# Patient Record
Sex: MALE | Race: WHITE | NOT HISPANIC OR LATINO | Employment: OTHER | URBAN - METROPOLITAN AREA
[De-identification: names, ages, dates, MRNs, and addresses within clinical notes are randomized per-mention and may not be internally consistent; named-entity substitution may affect disease eponyms.]

---

## 2017-01-13 ENCOUNTER — OFFICE VISIT (OUTPATIENT)
Dept: PULMONOLOGY | Facility: CLINIC | Age: 37
End: 2017-01-13
Payer: OTHER GOVERNMENT

## 2017-01-13 VITALS
OXYGEN SATURATION: 98 % | BODY MASS INDEX: 30.71 KG/M2 | SYSTOLIC BLOOD PRESSURE: 118 MMHG | RESPIRATION RATE: 18 BRPM | HEART RATE: 88 BPM | WEIGHT: 214.5 LBS | HEIGHT: 70 IN | DIASTOLIC BLOOD PRESSURE: 70 MMHG

## 2017-01-13 DIAGNOSIS — G47.33 OSA (OBSTRUCTIVE SLEEP APNEA): Primary | ICD-10-CM

## 2017-01-13 PROCEDURE — 99999 PR PBB SHADOW E&M-EST. PATIENT-LVL III: CPT | Mod: PBBFAC,,, | Performed by: INTERNAL MEDICINE

## 2017-01-13 PROCEDURE — 99204 OFFICE O/P NEW MOD 45 MIN: CPT | Mod: S$PBB,,, | Performed by: INTERNAL MEDICINE

## 2017-01-13 PROCEDURE — 99213 OFFICE O/P EST LOW 20 MIN: CPT | Mod: PBBFAC | Performed by: INTERNAL MEDICINE

## 2017-01-13 NOTE — PATIENT INSTRUCTIONS
Continuous Positive Airway Pressure (CPAP)  Your healthcare provider has prescribed continuous positive airway pressure (CPAP) therapy for you. A CPAP device helps you breathe better at night. The device sends air through your nose or mouth when you breathe in to keep your air passages open. CPAP is:  · Used most often to treat sleep apnea and some other problems. (Sleep apnea is a chronic condition with periods of sleep in which you briefly stop breathing.)  · Safe and very effective. But it takes time to get used to the mask.  Your healthcare provider, nurse, or medical supplier will give you tips for wearing and caring for your CPAP device.  General guidelines  Recommendations include the following:  · It's very important not to give up! It takes time to get used to wearing the mask at night.  · Practice using your CPAP device during the day, especially whenever you take a nap.  · Remember, there are several different types of masks. If you cant get used to your mask, ask your provider or medical supply company about trying another style.  · If you have nasal stuffiness or dryness when using your CPAP device, talk with your provider or medical supply company. There are ways to ease these problems. For example, your provider may recommend using a moistening nasal spray. Or the medical supply company may recommend a device with a humidifier.  · The goal is to use your CPAP all night, every night, during all naps, and even when you travel.  · Keep your mask clean. Wash it with soap and water. Be sure to rinse the mask and tubing well with water to remove any soap. Let them air-dry completely before using.  · Make yourself comfortable when sleeping with CPAP. Try using extra pillows.  Work with your medical supply company so that you know how to correctly use your CPAP. The company's representative will be able to help you:  · Use the CPAP correctly  · Troubleshoot any problems that come up  · Learn to clean and  maintain the device  · Adjust to regular use of the CPAP     The CPAP device settings are given as centimeters of water, or cm/H2O. Each persons pressure settings are different. Your healthcare provider will tell you what settings to use. Never change your CPAP pressure setting unless your provider tells you to.  CPAP ____________cm/H20 pressure when you breathe in   © 0292-6771 [employer]. [address] [place] [address], [place] [address]. All rights reserved. This information is not intended as a substitute for professional medical care. Always follow your healthcare professional's instructions.

## 2017-01-13 NOTE — LETTER
January 13, 2017      Mahsa Traylor MD  [address] [address] [address]           [facility]  [address] [place] [address]  Phone: [phone]  Fax: [phone]          Patient: Mickey Dominguez   MR Number: [id number]   YOB: 1980   Date of Visit: 1/13/2017       Dear Dr. Mahsa Traylor:    Thank you for referring Mickey Dominguez to me for evaluation. Attached you will find relevant portions of my assessment and plan of care.    If you have questions, please do not hesitate to call me. I look forward to following Mickey Dominguez along with you.    Sincerely,    Jordan Vitale MD    Enclosure  CC:  No Recipients    If you would like to receive this communication electronically, please contact [email or web address] or [phone] to request more information on [facility] [employer] Link access.    For providers and/or their staff who would like to refer a patient to [facility], please contact us through our one-stop-shop provider referral line, [facility], at [phone].    If you feel you have received this communication in error or would no longer like to receive these types of communications, please e-mail [email or web address]

## 2017-01-13 NOTE — PROGRESS NOTES
Subjective:      Patient ID: Mickey Dominguez is a 36 y.o. male.    There is no problem list on file for this patient.      Problem list has been reviewed.    Chief Complaint: Sleep Apnea        he has been referred by Mahsa Traylor for evaluation for possible obstructive sleep apnea    HPI:   Sleep Apnea:    He has a sleep study at sleep [facility] 6 months ago. He was diagnosed CLAUDE. He had a CPAP titration. His best pressure was CPAP of 13 CMWP.  CPAP was ordered and he is still waiting for his CPAP. He is still           STOP - BANG Questionnaire:     1. Snoring : Do you snore loudly ?    Yes    2. Tired : Do you often feel tired, fatigued, or sleepy during daytime? Yes    3. Observed: Has anyone observed you stop breathing during your sleep?   Yes     4. Blood pressure : Do you have or are you being treated for high blood pressure?   No    5. BMI :BMI more than 35 kg/m2?   No    6. Age : Age over 50 yr old?   No    7. Neck circumference: Neck circumference greater than 40 cm?   No    8. Gender: Gender male?   Yes    High risk of CLAUDE: Yes 5 - 8  Intermediate risk of CLAUDE: Yes 3 - 4  Low risk of CLAUDE: Yes 0 - 2      References:   STOP Questionnaire   A Tool to Screen Patients for Obstructive Sleep Apnea: LILIYA Callejas.R.C.P.C., JUANA Nieves.B.B.S., Davina Ceron M.D.,Madelin Reyes, Ph.D., JUANA Sharp.B.B.S.,_ JUANA Pike.Sc.,_ Jeanne Tilley M.D., LILIYA Rossi.R.C.P.C.; Anesthesiology 2008; 108:812-21 Copyright © 2008, the American Society of Anesthesiologists, Inc. Jami Ryan [employer]angel[employer], Inc.    EPWORTH SLEEPINESS SCALE    Sitting and reading ____  1  Watching TV ____  1  Sitting inactive in a public place ____  0  Being a passenger in a motor vehicle for an hour or more ____  1  Lying down in the afternoon  ____  0  Sitting and talking to someone  ____  0  Sitting quietly after lunch (no alcohol) ____  0  Stopped for a few minutes in traffic while driving   ____  0    Total score   ____  3    Interpretation:  0-7: Normal  sleepiness  8-9: Average amount of daytime sleepiness.  10-15:Excessively sleepiness depending on the situation. Medical attention suggested.  16-24: Excessive sleepiness and medical attention recommended.    Reference: Ariadne CASTRO. A new method for measuring daytime sleepiness: The [place]  Sleepiness Scale. Sleep 1991; 14(6):540-5.    Previous Report Reviewed: office notes     The following portions of the patient's history were reviewed and updated as appropriate: He  has a past medical history of Allergy; Anxiety; Arthritis; Closed skull fracture with intracranial hemorrhage, with loss of consciousness; Neuromuscular disorder; Obstructive sleep apnea on CPAP; and PTSD (post-traumatic stress disorder).  He  has a past surgical history that includes Rotator cuff repair; menicus repair; and Fracture surgery.  His family history is not on file.  He  reports that he has never smoked. He has never used smokeless tobacco. He reports that he does not drink alcohol or use illicit drugs.  He has a current medication list which includes the following prescription(s): amitriptyline, cyclobenzaprine, eszopiclone, and nabumetone.  He has No Known Allergies..    Review of Systems   HENT: Positive for hearing loss.    Respiratory: Positive for apnea.    Cardiovascular: Negative for chest pain and leg swelling.   Musculoskeletal: Positive for arthralgias, back pain and myalgias.   Gastrointestinal: Negative for abdominal pain and abdominal distention.   Neurological: Positive for headaches.   Psychiatric/Behavioral: The patient is nervous/anxious.    All other systems reviewed and are negative.     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    Avocational Exposures:  None currently.    Pet Exposures:  Dogs. Denies allergy to dog dander.    Objective:     Vitals:    01/13/17 0902   BP: 118/70  "  Pulse: 88   Resp: 18   SpO2: 98%   Weight: 97.3 kg (214 lb 8.1 oz)   Height: 5' 10" (1.778 m)     body mass index is 30.78 kg/(m^2).     Physical Exam   Constitutional: He appears well-developed and well-nourished.   HENT:   Head: Normocephalic and atraumatic.   Eyes: EOM are normal. Pupils are equal, round, and reactive to light.   Neck: Normal range of motion. Neck supple.   Cardiovascular: Normal rate and regular rhythm.    Pulmonary/Chest: Effort normal and breath sounds normal.   Abdominal: Soft. Bowel sounds are normal.   Musculoskeletal: Normal range of motion.   Neurological: He is alert. He has normal reflexes.   Skin: Skin is warm and dry.   Psychiatric: He has a normal mood and affect.   Nursing note and vitals reviewed.      Personal Diagnostic Review  none pertinent    Assessment:     1. CLAUDE (obstructive sleep apnea)        Orders Placed This Encounter   Procedures    CPAP FOR HOME USE     Order Specific Question:   Type:     Answer:   Auto CPAP     Order Specific Question:   Auto CPAP pressure setting range (cmH20):     Answer:   4 - 20 CMWP     Order Specific Question:   Length of need (1-99 months):     Answer:   99     Order Specific Question:   Humidification:     Answer:   Heated     Order Specific Question:   Type of mask:     Answer:   FFM     Order Specific Question:   Headgear?     Answer:   Yes     Order Specific Question:   Tubing?     Answer:   Yes     Order Specific Question:   Humidifier chamber?     Answer:   Yes     Order Specific Question:   Chin strap?     Answer:   Yes     Order Specific Question:   Filters?     Answer:   Yes       Plan:     Discussed diagnosis, its evaluation, treatment and usual course. All questions answered.    Start AUTOCPAP of  4 - 20 CMWP. [facility] Discussed therapeutic goals for positive airway pressure therapy(CPAP or BiPAP): Ideal is usage 100% of nights for 6 - 8 hours per night. Minimum usage is 70% of night for at least 4 hours per night " used. Pateint expressed understanding. All Questions answered.    We have discussed weight loss and how this may improve his  situation.    We have discussed behavioral modifications, as well.        TIME SPENT WITH PATIENT: Time spent: 45 minutes in face to face  discussion concerning diagnosis, prognosis, review of lab and test results, benefits of treatment as well as management of disease, counseling of patient and coordination of care between various health  care providers . Greater than half the time spent was used for coordination of care and counseling of patient.     Return in about 1 month (around 2/13/2017) for CLAUDE.

## 2017-04-24 ENCOUNTER — OFFICE VISIT (OUTPATIENT)
Dept: FAMILY MEDICINE | Facility: CLINIC | Age: 37
End: 2017-04-24
Payer: OTHER GOVERNMENT

## 2017-04-24 ENCOUNTER — HOSPITAL ENCOUNTER (OUTPATIENT)
Dept: RADIOLOGY | Facility: HOSPITAL | Age: 37
Discharge: HOME | End: 2017-04-24
Attending: FAMILY MEDICINE
Payer: OTHER GOVERNMENT

## 2017-04-24 VITALS — DIASTOLIC BLOOD PRESSURE: 88 MMHG | TEMPERATURE: 99 F | SYSTOLIC BLOOD PRESSURE: 140 MMHG

## 2017-04-24 DIAGNOSIS — G47.33 OSA ON CPAP: ICD-10-CM

## 2017-04-24 DIAGNOSIS — F51.04 PSYCHOPHYSIOLOGIC INSOMNIA: Primary | ICD-10-CM

## 2017-04-24 DIAGNOSIS — M25.561 RIGHT KNEE PAIN, UNSPECIFIED CHRONICITY: ICD-10-CM

## 2017-04-24 DIAGNOSIS — R09.82 POST-NASAL DRAINAGE: ICD-10-CM

## 2017-04-24 PROCEDURE — 73562 X-RAY EXAM OF KNEE 3: CPT | Mod: 26,RT,, | Performed by: RADIOLOGY

## 2017-04-24 PROCEDURE — 73560 X-RAY EXAM OF KNEE 1 OR 2: CPT | Mod: TC,PO,LT

## 2017-04-24 PROCEDURE — 99999 PR PBB SHADOW E&M-EST. PATIENT-LVL III: CPT | Mod: PBBFAC,,, | Performed by: FAMILY MEDICINE

## 2017-04-24 PROCEDURE — 99214 OFFICE O/P EST MOD 30 MIN: CPT | Mod: S$PBB,,, | Performed by: FAMILY MEDICINE

## 2017-04-24 PROCEDURE — 73560 X-RAY EXAM OF KNEE 1 OR 2: CPT | Mod: 26,59,LT, | Performed by: RADIOLOGY

## 2017-04-24 RX ORDER — FLUTICASONE PROPIONATE 50 MCG
2 SPRAY, SUSPENSION (ML) NASAL DAILY
Qty: 16 G | Refills: 5 | Status: SHIPPED | OUTPATIENT
Start: 2017-04-24 | End: 2017-09-27 | Stop reason: SDUPTHER

## 2017-04-24 RX ORDER — ESZOPICLONE 3 MG/1
TABLET, FILM COATED ORAL
Qty: 30 TABLET | Refills: 0 | Status: SHIPPED | OUTPATIENT
Start: 2017-04-24 | End: 2017-09-27 | Stop reason: SDUPTHER

## 2017-04-24 NOTE — PROGRESS NOTES
Subjective:       Patient ID: Mickey Dominguez is a 36 y.o. male.    Chief Complaint: Trouble Sleeping and Right Knee Pain    HPI   Trouble Sleeping  35yo male presents today reporting persistent trouble sleeping. He has been diagnosed with sleep apnea and is now using a CPAP after assessment per Pulmonology. He notes that he has been compliant with use since mid-February and has been using the mask for approximately 5 hours nightly. His headaches have improved significantly with use. He was previously taking Lunesta and has been out of RX. He has tried Melatonin and decreasing caffeine intake with minimal change in sleep. He remotely used Benadryl for unrelated issues and has been sleepy with use. He is on Amitriptyline but this does not help with sleep. He has never had evaluation by a sleep specialist.    Right Knee Pain  Patient notes right knee pain that has been ongoing for several years. He has a history of meniscal tear but admits he never underwent surgical intervention. A few days ago he reports that he began experiencing knee pain accompanied with stiffness and pain with weight bearing. No known injuries of late but he has been crawling around on the floor. He states the knee has been locking up intermittently. He describes audible clicking and popping. Pain is rated at 4/10 pain at its worst. He has been taking Relafen which helps. He notes use on a prn basis. He has not had any imaging in several years. He had an MRI 4-5 years ago at [facility].     Review of Systems   Constitutional: Positive for fatigue. Negative for appetite change and unexpected weight change.   HENT: Negative for congestion, ear pain, postnasal drip and sinus pressure.    Eyes: Negative for redness, itching and visual disturbance.   Respiratory: Negative for cough and shortness of breath.    Cardiovascular: Negative for chest pain and palpitations.   Gastrointestinal: Negative for abdominal pain, constipation, diarrhea and  nausea.   Endocrine: Negative for cold intolerance and heat intolerance.   Genitourinary: Negative for decreased urine volume and difficulty urinating.   Musculoskeletal: Positive for arthralgias and back pain. Negative for myalgias.   Skin: Negative for color change and rash.   Neurological: Negative for dizziness, weakness, numbness and headaches.   Psychiatric/Behavioral: Positive for sleep disturbance. Negative for dysphoric mood. The patient is not nervous/anxious.        Objective:   BP (!) 140/88 (BP Location: Left arm, Patient Position: Sitting, BP Method: Manual)  Temp 98.5 °F (36.9 °C) (Tympanic)   Physical Exam   Constitutional: He is oriented to person, place, and time. He appears well-developed. No distress.   Obese, non-toxic   HENT:   Head: Normocephalic and atraumatic.   Right Ear: Tympanic membrane, external ear and ear canal normal.   Left Ear: Tympanic membrane, external ear and ear canal normal.   Nose: Mucosal edema and rhinorrhea present.   Mouth/Throat: Oropharynx is clear and moist.   Eyes: Conjunctivae and EOM are normal. Pupils are equal, round, and reactive to light.   Neck: Normal range of motion. Neck supple.   Cardiovascular: Normal rate, regular rhythm and normal heart sounds.    Pulmonary/Chest: Effort normal and breath sounds normal.   Abdominal: Soft. Bowel sounds are normal. There is no tenderness.   Musculoskeletal: He exhibits no edema.        Right knee: He exhibits normal range of motion, no erythema and normal alignment. No tenderness found. No medial joint line and no lateral joint line tenderness noted.   +crepitus right knee   Neurological: He is alert and oriented to person, place, and time.   Skin: Skin is warm and dry. He is not diaphoretic.   Psychiatric: He has a normal mood and affect. His behavior is normal.       Assessment:       1. Psychophysiologic insomnia    2. CLAUDE on CPAP    3. Post-nasal drainage    4. Right knee pain, unspecified chronicity        Plan:    Psychophysiologic insomnia  Recommend pursuing evaluation per [facility]. Reviewed sleep hygiene measures. Renewal of Lunesta has been provided but there is no plan to continue with this RX long term- he is agreeable and acknowledges understanding. Will assess labs.   -     TSH; Future; Expected date: 4/24/17  -     T4, free; Future; Expected date: 4/24/17  -     Comprehensive metabolic panel; Future; Expected date: 4/24/17  -     eszopiclone 3 mg Tab; TAKE 1 TABLET BY MOUTH IMMEDIATELY BEFORE BEDTIME EVERY NIGHT  Dispense: 30 tablet; Refill: 0    CLAUDE on CPAP  Recommend compliance with use and sleep hygiene measures as above.    Post-nasal drainage  -     fluticasone (FLONASE) 50 mcg/actuation nasal spray; 2 sprays by Each Nare route once daily.  Dispense: 16 g; Refill: 5    Right knee pain, unspecified chronicity  Patient is currently seeing Ortho (non[facility]) and has been advised to proceed with imaging and further assessment. Relafen use will be continued.  -     X-ray Knee Ortho Right; Future; Expected date: 4/24/17    RTC for wellness at convenience.

## 2017-04-24 NOTE — MR AVS SNAPSHOT
[facility]  [address]  [facility] [address]  Phone: [phone]  Fax: [phone]                  Mickey Dominguez   2017 11:20 AM   Office Visit    Description:  Male : 1980   Provider:  Mahsa Traylor MD   Department:  [facility]           Reason for Visit     Trouble Sleeping     Right Knee Pain           Diagnoses this Visit        Comments    CLAUDE on CPAP    -  Primary     Psychophysiologic insomnia         Post-nasal drainage         Right knee pain, unspecified chronicity                To Do List           Future Appointments        Provider Department Dept Phone    2017 12:15 PM [facility] XR1 [facility] [phone]    2017 3:00 PM [facility]ATORY, [place] [facility]       Matt (5 Years of Data)     None       These Medications        Disp Refills Start End    eszopiclone 3 mg Tab 30 tablet 0 2017     TAKE 1 TABLET BY MOUTH IMMEDIATELY BEFORE BEDTIME EVERY NIGHT    Pharmacy: [facility] [address] [place] [address] Ph #: [phone]       fluticasone (FLONASE) 50 mcg/actuation nasal spray 16 g 5 2017     2 sprays by Each Nare route once daily. - Each Nare    Pharmacy: [facility] [address] [place] [address] Ph #: [phone]         [facility]     [facility] Nurse Care Line -  Assistance  Unless otherwise directed by your provider, please contact [facility], our nurse care line that is available for  assistance.     Registered nurses in the [facility] provide: appointment scheduling, clinical advisement, health education, and other advisory services.  Call: [phone] (toll free)               Medications           Message regarding Medications     Verify the changes and/or additions to your medication regime listed below are the same as discussed with your clinician today.  If any  of these changes or additions are incorrect, please notify your healthcare provider.        START taking these NEW medications        Refills    fluticasone (FLONASE) 50 mcg/actuation nasal spray 5    Si sprays by Each Nare route once daily.    Class: Normal    Route: Each Nare           Verify that the below list of medications is an accurate representation of the medications you are currently taking.  If none reported, the list may be blank. If incorrect, please contact your healthcare provider. Carry this list with you in case of emergency.           Current Medications     amitriptyline (ELAVIL) 75 MG tablet Take 1 tablet (75 mg total) by mouth every evening.    cyclobenzaprine (FLEXERIL) 10 MG tablet Take 1 tablet (10 mg total) by mouth every evening.    eszopiclone 3 mg Tab TAKE 1 TABLET BY MOUTH IMMEDIATELY BEFORE BEDTIME EVERY NIGHT    nabumetone (RELAFEN) 500 MG tablet Take 1 tablet (500 mg total) by mouth 2 (two) times daily as needed for Pain.    fluticasone (FLONASE) 50 mcg/actuation nasal spray 2 sprays by Each Nare route once daily.           Clinical Reference Information           Your Vitals Were     BP Temp                140/88 (BP Location: Left arm, Patient Position: Sitting, BP Method: Manual) 98.5 °F (36.9 °C) (Tympanic)          Blood Pressure          Most Recent Value    BP  (!)  140/88      Allergies as of 2017     No Known Allergies      Immunizations Administered on Date of Encounter - 2017     None      Orders Placed During Today's Visit     Future Labs/Procedures Expected by Expires    Comprehensive metabolic panel  2017    T4, free  2017    TSH  2017    X-ray Knee Ortho Right  2017      Language Assistance Services     ATTENTION: Language assistance services are available, free of charge. Please call [phone].      ATENCIÓN: Si habla español, tiene a zuñiga disposición servicios gratuitos de asistencia  lingüística. Sari al [phone].     REJI Ý: N?u b?n nói Ti?ng Vi?t, có các d?ch v? h? tr? ngôn ng? mi?n phí dành cho b?n. G?i s? [phone].         [facility] complies with applicable Federal civil rights laws and does not discriminate on the basis of race, color, national origin, age, disability, or sex.

## 2017-05-15 ENCOUNTER — TELEPHONE (OUTPATIENT)
Dept: FAMILY MEDICINE | Facility: CLINIC | Age: 37
End: 2017-05-15

## 2017-05-15 DIAGNOSIS — G47.33 OSA ON CPAP: Primary | ICD-10-CM

## 2017-05-15 DIAGNOSIS — F51.04 PSYCHOPHYSIOLOGIC INSOMNIA: ICD-10-CM

## 2017-05-15 NOTE — TELEPHONE ENCOUNTER
Please print referral for [facility] and let patient know this is being sent after building it into the system.

## 2017-05-17 NOTE — TELEPHONE ENCOUNTER
5/16/2017   Nalini Serrano 12:01 PM      pt julia charles mrn# [id number] has  he has a referral for [facility] [address]  [facility] [address]   Phone: [phone]   thanks for helping out      Angela Cardona 2:20 PM      Authorization is pending with Luis.      Nalini Serrano 2:24 PM      thank you     Angela Cardona 2:30 PM      you're welcome     Called pt and informed him on voicemail that the referral is being worked on by [facility] referral dept. Will keep him updated.

## 2017-05-17 NOTE — TELEPHONE ENCOUNTER
Angela Cardona 10:26 AM      patient has an authorization on file with Luis to Dr Jordan Vitale for sleep apnea. Luis wants to know if the referral to [facility] is for a 2nd opinion or is the patient changing providers.

## 2017-05-26 ENCOUNTER — TELEPHONE (OUTPATIENT)
Dept: FAMILY MEDICINE | Facility: CLINIC | Age: 37
End: 2017-05-26

## 2017-05-26 NOTE — TELEPHONE ENCOUNTER
Unable to reach pt, left voice mail.        [facility] has denied authorization for referral to [facility] because the facility is not in network with the patient's  [employer]. [facility] has redirected the patient to Dr Mimi Tena and has an authorization on file.       [facility] MESSAGE:    COMMUNICATION TO PROVIDER    THE FACILITY/PROVIDER YOU REQUESTED IS NOT LISTED AS A [facility] NETWORK    PROVIDER; A [facility] NETWORK FACILITY/PROVIDER HAS BEEN SELECTED.    FACILITY/PROVIDER'S NAME: MIMI TENA/[facility] SLEEP DISORDERS

## 2017-09-27 ENCOUNTER — HOSPITAL ENCOUNTER (OUTPATIENT)
Dept: RADIOLOGY | Facility: HOSPITAL | Age: 37
Discharge: HOME | End: 2017-09-27
Attending: FAMILY MEDICINE
Payer: OTHER GOVERNMENT

## 2017-09-27 ENCOUNTER — OFFICE VISIT (OUTPATIENT)
Dept: FAMILY MEDICINE | Facility: CLINIC | Age: 37
End: 2017-09-27
Payer: OTHER GOVERNMENT

## 2017-09-27 VITALS
HEIGHT: 71 IN | HEART RATE: 82 BPM | SYSTOLIC BLOOD PRESSURE: 138 MMHG | RESPIRATION RATE: 15 BRPM | BODY MASS INDEX: 30.98 KG/M2 | DIASTOLIC BLOOD PRESSURE: 88 MMHG | WEIGHT: 221.25 LBS | OXYGEN SATURATION: 99 % | TEMPERATURE: 98 F

## 2017-09-27 DIAGNOSIS — F43.10 PTSD (POST-TRAUMATIC STRESS DISORDER): ICD-10-CM

## 2017-09-27 DIAGNOSIS — E66.9 OBESITY (BMI 30-39.9): ICD-10-CM

## 2017-09-27 DIAGNOSIS — G89.29 CHRONIC LOW BACK PAIN WITHOUT SCIATICA, UNSPECIFIED BACK PAIN LATERALITY: Primary | ICD-10-CM

## 2017-09-27 DIAGNOSIS — F51.04 PSYCHOPHYSIOLOGIC INSOMNIA: ICD-10-CM

## 2017-09-27 DIAGNOSIS — M62.830 LUMBAR PARASPINAL MUSCLE SPASM: ICD-10-CM

## 2017-09-27 DIAGNOSIS — G47.33 OSA ON CPAP: ICD-10-CM

## 2017-09-27 DIAGNOSIS — R05.9 COUGH: ICD-10-CM

## 2017-09-27 DIAGNOSIS — M54.50 CHRONIC LOW BACK PAIN WITHOUT SCIATICA, UNSPECIFIED BACK PAIN LATERALITY: Primary | ICD-10-CM

## 2017-09-27 DIAGNOSIS — J20.9 ACUTE BRONCHITIS, UNSPECIFIED ORGANISM: ICD-10-CM

## 2017-09-27 DIAGNOSIS — R09.82 POST-NASAL DRAINAGE: ICD-10-CM

## 2017-09-27 PROCEDURE — 99999 PR PBB SHADOW E&M-EST. PATIENT-LVL IV: CPT | Mod: PBBFAC,,, | Performed by: FAMILY MEDICINE

## 2017-09-27 PROCEDURE — 71020 XR CHEST PA AND LATERAL: CPT | Mod: 26,,, | Performed by: RADIOLOGY

## 2017-09-27 PROCEDURE — 71020 XR CHEST PA AND LATERAL: CPT | Mod: TC,PO

## 2017-09-27 PROCEDURE — 3008F BODY MASS INDEX DOCD: CPT | Mod: ,,, | Performed by: FAMILY MEDICINE

## 2017-09-27 PROCEDURE — 99214 OFFICE O/P EST MOD 30 MIN: CPT | Mod: S$PBB,,, | Performed by: FAMILY MEDICINE

## 2017-09-27 PROCEDURE — 99214 OFFICE O/P EST MOD 30 MIN: CPT | Mod: PBBFAC,25,PO | Performed by: FAMILY MEDICINE

## 2017-09-27 RX ORDER — NABUMETONE 500 MG/1
500 TABLET, FILM COATED ORAL 2 TIMES DAILY PRN
Qty: 180 TABLET | Refills: 1 | Status: SHIPPED | OUTPATIENT
Start: 2017-09-27 | End: 2018-05-28 | Stop reason: SDUPTHER

## 2017-09-27 RX ORDER — AMITRIPTYLINE HYDROCHLORIDE 75 MG/1
75 TABLET ORAL NIGHTLY
Qty: 90 TABLET | Refills: 1 | Status: SHIPPED | OUTPATIENT
Start: 2017-09-27 | End: 2018-05-28

## 2017-09-27 RX ORDER — METHYLPREDNISOLONE 4 MG/1
TABLET ORAL
Qty: 1 PACKAGE | Refills: 0 | Status: SHIPPED | OUTPATIENT
Start: 2017-09-27 | End: 2018-05-28

## 2017-09-27 RX ORDER — CYCLOBENZAPRINE HCL 10 MG
10 TABLET ORAL NIGHTLY
Qty: 90 TABLET | Refills: 1 | Status: SHIPPED | OUTPATIENT
Start: 2017-09-27 | End: 2018-05-28 | Stop reason: SDUPTHER

## 2017-09-27 RX ORDER — ALBUTEROL SULFATE 90 UG/1
2 AEROSOL, METERED RESPIRATORY (INHALATION) EVERY 6 HOURS PRN
Qty: 1 INHALER | Refills: 0 | Status: SHIPPED | OUTPATIENT
Start: 2017-09-27 | End: 2018-05-28

## 2017-09-27 RX ORDER — ESZOPICLONE 3 MG/1
TABLET, FILM COATED ORAL
Qty: 30 TABLET | Refills: 0 | Status: SHIPPED | OUTPATIENT
Start: 2017-09-27 | End: 2018-05-30

## 2017-09-27 RX ORDER — ESZOPICLONE 3 MG/1
TABLET, FILM COATED ORAL
Qty: 30 TABLET | Refills: 0 | Status: CANCELLED | OUTPATIENT
Start: 2017-09-27

## 2017-09-27 RX ORDER — FLUTICASONE PROPIONATE 50 MCG
2 SPRAY, SUSPENSION (ML) NASAL DAILY
Qty: 16 G | Refills: 5 | Status: SHIPPED | OUTPATIENT
Start: 2017-09-27 | End: 2018-05-28 | Stop reason: SDUPTHER

## 2017-09-27 NOTE — PROGRESS NOTES
"Subjective:       Patient ID: Mickey Dominguez is a 36 y.o. male.    Chief Complaint: Chronic Care    HPI   Chronic Care  37yo male presents today for chronic care assessment. He continues to report sleep related issues in spite of CPAP use. He is only sleeping 4-5 hours nightly. 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 He has not yet been seen at [facility]. PTSD has been stable with Amitriptyline use and he does not feel any adjustment to his regimen is needed at this time. Low back pain has been waxing and waning depending on activity level. He is taking Flexeril once nightly and Relafen BID and both measures afford benefit. For the past 5 days he notes increased cough accompanied with wheezing. His son has been ill with similar symptoms. Use of Daria [place] has afforded some benefit. No known fever. He has not had seasonal flu vaccination to date.    Review of Systems   Constitutional: Negative for chills, fatigue and unexpected weight change.   HENT: Positive for postnasal drip. Negative for congestion and sore throat.    Eyes: Negative for pain, redness, itching and visual disturbance.   Respiratory: Positive for cough and wheezing. Negative for chest tightness and shortness of breath.    Cardiovascular: Negative for chest pain, palpitations and leg swelling.   Gastrointestinal: Negative for abdominal pain, constipation and diarrhea.   Genitourinary: Negative for decreased urine volume and difficulty urinating.   Musculoskeletal: Positive for back pain. Negative for arthralgias.   Skin: Negative for color change and rash.   Neurological: Negative for dizziness, weakness and headaches.   Psychiatric/Behavioral: Positive for sleep disturbance. Negative for dysphoric mood. The patient is not nervous/anxious.        Objective:   /88   Pulse 82   Temp 97.5 °F (36.4 °C) (Temporal)   Resp 15   Ht 5' 11" (1.803 m)   Wt 100.4 kg (221 " lb 3.7 oz)   SpO2 99%   BMI 30.86 kg/m²   Physical Exam   Constitutional: He is oriented to person, place, and time. He appears well-developed. No distress.   Obese, non-toxic   HENT:   Head: Normocephalic and atraumatic.   Right Ear: Tympanic membrane, external ear and ear canal normal.   Left Ear: Tympanic membrane, external ear and ear canal normal.   Nose: Nose normal.   Mouth/Throat: Oropharynx is clear and moist.   Eyes: Conjunctivae and EOM are normal. Pupils are equal, round, and reactive to light.   Neck: Normal range of motion. Neck supple.   Cardiovascular: Normal rate, regular rhythm and normal heart sounds.    Pulmonary/Chest: Effort normal. No respiratory distress. He has wheezes.   Abdominal: Soft. Bowel sounds are normal. There is no tenderness.   Musculoskeletal: He exhibits no edema.   Neurological: He is alert and oriented to person, place, and time.   Skin: Skin is warm and dry. He is not diaphoretic.   Psychiatric: He has a normal mood and affect. His behavior is normal.       Assessment:       1. Chronic low back pain without sciatica, unspecified back pain laterality    2. Lumbar paraspinal muscle spasm    3. Post-nasal drainage    4. Acute bronchitis, unspecified organism    5. Cough    6. Psychophysiologic insomnia    7. PTSD (post-traumatic stress disorder)    8. CLAUDE on CPAP    9. Obesity (BMI 30-39.9)        Plan:   Chronic low back pain without sciatica, unspecified back pain laterality  Stable. Will continue with current treatment plan.   -     amitriptyline (ELAVIL) 75 MG tablet; Take 1 tablet (75 mg total) by mouth every evening.  Dispense: 90 tablet; Refill: 1  -     nabumetone (RELAFEN) 500 MG tablet; Take 1 tablet (500 mg total) by mouth 2 (two) times daily as needed for Pain.  Dispense: 180 tablet; Refill: 1    Lumbar paraspinal muscle spasm  Variable. Consider heat alternating with ice. Flexeril prn.  -     cyclobenzaprine (FLEXERIL) 10 MG tablet; Take 1 tablet (10 mg total) by  mouth every evening.  Dispense: 90 tablet; Refill: 1    Post-nasal drainage  -     fluticasone (FLONASE) 50 mcg/actuation nasal spray; 2 sprays by Each Nare route once daily.  Dispense: 16 g; Refill: 5    Acute bronchitis, unspecified organism  -     methylPREDNISolone (MEDROL DOSEPACK) 4 mg tablet; use as directed  Dispense: 1 Package; Refill: 0    Cough  No evidence of PNA proceed as above.  -     X-Ray Chest PA And Lateral; Future; Expected date: 09/27/2017  -     albuterol 90 mcg/actuation inhaler; Inhale 2 puffs into the lungs every 6 (six) hours as needed. Dispense with spacer.  Dispense: 1 Inhaler; Refill: 0    Psychophysiologic insomnia   was accessed prior to renewal of RX with last fill date of 4/25/17.   -     eszopiclone 3 mg Tab; TAKE 1 TABLET BY MOUTH IMMEDIATELY BEFORE BEDTIME EVERY NIGHT  Dispense: 30 tablet; Refill: 0    PTSD (post-traumatic stress disorder)  Stable with Amitriptyline use. Patient denies any concerns with regard to his pending deployment.    CLAUDE on CPAP  Continued compliance is reported. Recommend seeing [facility] when he returns from deployment.    Obesity  Weight loss efforts remain encouraged.

## 2018-05-28 ENCOUNTER — OFFICE VISIT (OUTPATIENT)
Dept: FAMILY MEDICINE | Facility: CLINIC | Age: 38
End: 2018-05-28
Payer: OTHER GOVERNMENT

## 2018-05-28 ENCOUNTER — LAB VISIT (OUTPATIENT)
Dept: LAB | Facility: HOSPITAL | Age: 38
End: 2018-05-28
Attending: FAMILY MEDICINE
Payer: OTHER GOVERNMENT

## 2018-05-28 VITALS
OXYGEN SATURATION: 99 % | DIASTOLIC BLOOD PRESSURE: 80 MMHG | HEIGHT: 71 IN | RESPIRATION RATE: 18 BRPM | BODY MASS INDEX: 29.86 KG/M2 | SYSTOLIC BLOOD PRESSURE: 130 MMHG | TEMPERATURE: 97 F | HEART RATE: 70 BPM | WEIGHT: 213.31 LBS

## 2018-05-28 DIAGNOSIS — R09.82 POST-NASAL DRAINAGE: ICD-10-CM

## 2018-05-28 DIAGNOSIS — F51.04 PSYCHOPHYSIOLOGIC INSOMNIA: ICD-10-CM

## 2018-05-28 DIAGNOSIS — M54.50 CHRONIC LOW BACK PAIN WITHOUT SCIATICA, UNSPECIFIED BACK PAIN LATERALITY: ICD-10-CM

## 2018-05-28 DIAGNOSIS — G89.29 CHRONIC LOW BACK PAIN WITHOUT SCIATICA, UNSPECIFIED BACK PAIN LATERALITY: ICD-10-CM

## 2018-05-28 DIAGNOSIS — H54.7 VISION DECREASED: ICD-10-CM

## 2018-05-28 DIAGNOSIS — Z30.09 VASECTOMY EVALUATION: ICD-10-CM

## 2018-05-28 DIAGNOSIS — F43.23 ADJUSTMENT DISORDER WITH MIXED ANXIETY AND DEPRESSED MOOD: ICD-10-CM

## 2018-05-28 DIAGNOSIS — M62.830 LUMBAR PARASPINAL MUSCLE SPASM: Primary | ICD-10-CM

## 2018-05-28 DIAGNOSIS — F43.10 PTSD (POST-TRAUMATIC STRESS DISORDER): ICD-10-CM

## 2018-05-28 DIAGNOSIS — G47.33 OSA ON CPAP: ICD-10-CM

## 2018-05-28 LAB
ALBUMIN SERPL BCP-MCNC: 4.1 G/DL
ALP SERPL-CCNC: 58 U/L
ALT SERPL W/O P-5'-P-CCNC: 25 U/L
ANION GAP SERPL CALC-SCNC: 8 MMOL/L
AST SERPL-CCNC: 22 U/L
BASOPHILS # BLD AUTO: 0.04 K/UL
BASOPHILS NFR BLD: 0.5 %
BILIRUB SERPL-MCNC: 0.6 MG/DL
BUN SERPL-MCNC: 10 MG/DL
CALCIUM SERPL-MCNC: 9.4 MG/DL
CHLORIDE SERPL-SCNC: 103 MMOL/L
CO2 SERPL-SCNC: 27 MMOL/L
CREAT SERPL-MCNC: 0.9 MG/DL
DIFFERENTIAL METHOD: ABNORMAL
EOSINOPHIL # BLD AUTO: 0.1 K/UL
EOSINOPHIL NFR BLD: 1.1 %
ERYTHROCYTE [DISTWIDTH] IN BLOOD BY AUTOMATED COUNT: 12.7 %
EST. GFR  (AFRICAN AMERICAN): >60 ML/MIN/1.73 M^2
EST. GFR  (NON AFRICAN AMERICAN): >60 ML/MIN/1.73 M^2
GLUCOSE SERPL-MCNC: 95 MG/DL
HCT VFR BLD AUTO: 46.4 %
HGB BLD-MCNC: 16.3 G/DL
IMM GRANULOCYTES # BLD AUTO: 0.06 K/UL
IMM GRANULOCYTES NFR BLD AUTO: 0.7 %
LYMPHOCYTES # BLD AUTO: 3 K/UL
LYMPHOCYTES NFR BLD: 34.9 %
MCH RBC QN AUTO: 28.5 PG
MCHC RBC AUTO-ENTMCNC: 35.1 G/DL
MCV RBC AUTO: 81 FL
MONOCYTES # BLD AUTO: 0.6 K/UL
MONOCYTES NFR BLD: 6.4 %
NEUTROPHILS # BLD AUTO: 4.8 K/UL
NEUTROPHILS NFR BLD: 56.4 %
NRBC BLD-RTO: 0 /100 WBC
PLATELET # BLD AUTO: 189 K/UL
PMV BLD AUTO: 11.4 FL
POTASSIUM SERPL-SCNC: 4.1 MMOL/L
PROT SERPL-MCNC: 7.6 G/DL
RBC # BLD AUTO: 5.72 M/UL
SODIUM SERPL-SCNC: 138 MMOL/L
TSH SERPL DL<=0.005 MIU/L-ACNC: 2.63 UIU/ML
WBC # BLD AUTO: 8.57 K/UL

## 2018-05-28 PROCEDURE — 80053 COMPREHEN METABOLIC PANEL: CPT

## 2018-05-28 PROCEDURE — 85025 COMPLETE CBC W/AUTO DIFF WBC: CPT

## 2018-05-28 PROCEDURE — 36415 COLL VENOUS BLD VENIPUNCTURE: CPT | Mod: PO

## 2018-05-28 PROCEDURE — 99214 OFFICE O/P EST MOD 30 MIN: CPT | Mod: S$PBB,,, | Performed by: FAMILY MEDICINE

## 2018-05-28 PROCEDURE — 99999 PR PBB SHADOW E&M-EST. PATIENT-LVL V: CPT | Mod: PBBFAC,,, | Performed by: FAMILY MEDICINE

## 2018-05-28 PROCEDURE — 84443 ASSAY THYROID STIM HORMONE: CPT

## 2018-05-28 PROCEDURE — 99215 OFFICE O/P EST HI 40 MIN: CPT | Mod: PBBFAC,PO | Performed by: FAMILY MEDICINE

## 2018-05-28 RX ORDER — NABUMETONE 500 MG/1
500 TABLET, FILM COATED ORAL 2 TIMES DAILY PRN
Qty: 180 TABLET | Refills: 1 | Status: SHIPPED | OUTPATIENT
Start: 2018-05-28 | End: 2018-09-10

## 2018-05-28 RX ORDER — CYCLOBENZAPRINE HCL 10 MG
10 TABLET ORAL NIGHTLY
Qty: 90 TABLET | Refills: 1 | Status: SHIPPED | OUTPATIENT
Start: 2018-05-28 | End: 2019-02-13 | Stop reason: SDUPTHER

## 2018-05-28 RX ORDER — ESZOPICLONE 3 MG/1
TABLET, FILM COATED ORAL
Qty: 30 TABLET | Refills: 0 | Status: CANCELLED | OUTPATIENT
Start: 2018-05-28

## 2018-05-28 RX ORDER — ESZOPICLONE 3 MG/1
TABLET, FILM COATED ORAL
COMMUNITY
Start: 2016-12-16 | End: 2018-05-28

## 2018-05-28 RX ORDER — FLUTICASONE PROPIONATE 50 MCG
2 SPRAY, SUSPENSION (ML) NASAL DAILY
Qty: 16 G | Refills: 5 | Status: SHIPPED | OUTPATIENT
Start: 2018-05-28 | End: 2020-07-29

## 2018-05-28 NOTE — PROGRESS NOTES
"Subjective:       Patient ID: Mickey Dominguez is a 37 y.o. male.    Chief Complaint: Chronic Care    HPI   Chronic Care  36yo male presents today for chronic care assessment. He has been out of Flexeril and Nabumetone which he had previously been taking for his LBP. 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 He has been taking Ibuprofen 800mg (otc) twice weekly and this affords benefit. He notes no worsening of pain. He describes "tension" to the muscles and there is worsening after sitting for prolonged period of time or driving extensively. He also notes after a restless night of sleep there is added pain. He has CLAUDE and is on CPAP but admits that he is at about 40% compliance with use. He attributes this in part due to recent travel for work and is not able to take his machine with him. He has not had a visit with Pulmonology since January 2017 and reports further upcoming travel with no plans for assessment at this time. Patient is requesting an appt with Optometry. He notes some changes in his vision. He is also interested in pursuing vasectomy.     Review of Systems   Constitutional: Negative for activity change, appetite change, fatigue and unexpected weight change.   HENT: Positive for postnasal drip. Negative for congestion, ear pain and sinus pressure.    Eyes: Positive for visual disturbance. Negative for pain.   Respiratory: Negative for cough and shortness of breath.    Cardiovascular: Negative for chest pain and palpitations.   Gastrointestinal: Negative for abdominal pain, constipation and diarrhea.   Genitourinary: Negative for decreased urine volume and difficulty urinating.   Musculoskeletal: Positive for back pain. Negative for arthralgias and myalgias.   Skin: Negative for rash.   Allergic/Immunologic: Positive for environmental allergies.   Neurological: Negative for dizziness, weakness and headaches.   Psychiatric/Behavioral: Positive for sleep disturbance. Negative for dysphoric mood. " "The patient is nervous/anxious.        Objective:   /80   Pulse 70   Temp 97.2 °F (36.2 °C) (Temporal)   Resp 18   Ht 5' 11" (1.803 m)   Wt 96.7 kg (213 lb 4.7 oz)   SpO2 99%   BMI 29.75 kg/m²   Physical Exam   Constitutional: He is oriented to person, place, and time. He appears well-developed and well-nourished. No distress.   HENT:   Head: Normocephalic and atraumatic.   Right Ear: Tympanic membrane, external ear and ear canal normal.   Left Ear: Tympanic membrane, external ear and ear canal normal.   Nose: Nose normal.   Mouth/Throat: Oropharynx is clear and moist.   Eyes: Conjunctivae and EOM are normal. Pupils are equal, round, and reactive to light.   Neck: Normal range of motion. Neck supple.   Cardiovascular: Normal rate, regular rhythm and normal heart sounds.    Pulmonary/Chest: Effort normal and breath sounds normal.   Abdominal: Soft. Bowel sounds are normal.   Musculoskeletal: He exhibits no edema.        Lumbar back: He exhibits normal range of motion and no tenderness.   Neurological: He is alert and oriented to person, place, and time.   Skin: Skin is warm and dry. He is not diaphoretic.   Psychiatric: He has a normal mood and affect. His behavior is normal.       Assessment:       1. Lumbar paraspinal muscle spasm    2. Chronic low back pain without sciatica, unspecified back pain laterality    3. Psychophysiologic insomnia    4. Post-nasal drainage    5. CLAUDE on CPAP    6. Vision decreased    7. Vasectomy evaluation    8. Adjustment disorder with mixed anxiety and depressed mood    9. PTSD (post-traumatic stress disorder)        Plan:      Lumbar paraspinal muscle spasm  -     cyclobenzaprine (FLEXERIL) 10 MG tablet; Take 1 tablet (10 mg total) by mouth every evening.  Dispense: 90 tablet; Refill: 1    Chronic low back pain without sciatica, unspecified back pain laterality  -     nabumetone (RELAFEN) 500 MG tablet; Take 1 tablet (500 mg total) by mouth 2 (two) times daily as needed " for Pain.  Dispense: 180 tablet; Refill: 1    Psychophysiologic insomnia  -     Comprehensive metabolic panel; Future; Expected date: 05/28/2018  -     TSH; Future; Expected date: 05/28/2018  -     CBC auto differential; Future; Expected date: 05/28/2018    Post-nasal drainage  -     fluticasone (FLONASE) 50 mcg/actuation nasal spray; 2 sprays (100 mcg total) by Each Nare route once daily.  Dispense: 16 g; Refill: 5    CLAUDE on CPAP  -     Ambulatory Referral to Pulmonology    Vision decreased  -     Ambulatory Referral to Optometry    Vasectomy evaluation  -     Ambulatory Referral to Urology    Adjustment disorder with mixed anxiety and depressed mood  Reviewed coping mechanisms. Offered referral for counseling/therapy which he has declined.    PTSD  Patient denies any issues with flashbacks.    RTC in 6 months for follow-up.

## 2018-05-30 ENCOUNTER — OFFICE VISIT (OUTPATIENT)
Dept: OPHTHALMOLOGY | Facility: CLINIC | Age: 38
End: 2018-05-30
Payer: OTHER GOVERNMENT

## 2018-05-30 DIAGNOSIS — Z98.890 HISTORY OF LASER REFRACTIVE SURGERY: ICD-10-CM

## 2018-05-30 DIAGNOSIS — Z46.0 ENCOUNTER FOR FITTING OR ADJUSTMENT OF SPECTACLES OR CONTACT LENSES: ICD-10-CM

## 2018-05-30 DIAGNOSIS — Z01.00 ENCOUNTER FOR EYE EXAM: Primary | ICD-10-CM

## 2018-05-30 DIAGNOSIS — H52.13 MYOPIA, BILATERAL: ICD-10-CM

## 2018-05-30 PROCEDURE — 92004 COMPRE OPH EXAM NEW PT 1/>: CPT | Mod: S$PBB,,, | Performed by: OPTOMETRIST

## 2018-05-30 PROCEDURE — 92310 CONTACT LENS FITTING OU: CPT | Mod: ,,, | Performed by: OPTOMETRIST

## 2018-05-30 PROCEDURE — 99999 PR PBB SHADOW E&M-EST. PATIENT-LVL II: CPT | Mod: PBBFAC,,, | Performed by: OPTOMETRIST

## 2018-05-30 PROCEDURE — 92015 DETERMINE REFRACTIVE STATE: CPT | Mod: ,,, | Performed by: OPTOMETRIST

## 2018-05-30 PROCEDURE — 99212 OFFICE O/P EST SF 10 MIN: CPT | Mod: PBBFAC | Performed by: OPTOMETRIST

## 2018-05-30 NOTE — LETTER
May 30, 2018      Mahsa Traylor MD  [address] [address] [address]           [facility]  [address] [place] [id number]  Phone: [phone]  Fax: [phone]          Patient: Mickey Dominguez   MR Number: [id number]   YOB: 1980   Date of Visit: 5/30/2018       Dear Dr. Mahsa Traylor:    Thank you for referring Mickey Dominguez to me for evaluation. Attached you will find relevant portions of my assessment and plan of care.    If you have questions, please do not hesitate to call me. I look forward to following Mickey Dominguez along with you.    Sincerely,    Arash Castellano, OD    Enclosure  CC:  No Recipients    If you would like to receive this communication electronically, please contact [email or web address] or [phone] to request more information on [employer] Link access.    For providers and/or their staff who would like to refer a patient to [facility], please contact us through our one-stop-shop provider referral line, [facility], at [phone].    If you feel you have received this communication in error or would no longer like to receive these types of communications, please e-mail [email or web address]

## 2018-05-30 NOTE — PATIENT INSTRUCTIONS
Encounter for eye exam     Encounter for fitting or adjustment of spectacles or contact lenses     Myopia, bilateral     History of laser refractive surgery        Mild stromal haze s/p PRK     Discussed CL charges.  Dispense Final Rx for glasses  Note changes CL Rx  RTC 1 year  Discussed above and answered questions.

## 2018-05-30 NOTE — PROGRESS NOTES
HPI     Eye Exam    Additional comments: Yearly           Comments   NP to [facility].  Last eye exam 1 year ago  Noticed vision changes at a distance since last exam  Wears Acuvue Oasys CTL full-time (not wearing today)  Denies sleeping in CTL  No other complaints  Uses opti-free solution and Renew drops  1. PRK sx 10+ years ago in [place] previous -4.00 Rx       Last edited by Arash Castellano, OD on 5/30/2018  2:56 PM. (History)            Assessment /Plan     For exam results, see Encounter Report.    Encounter for eye exam    Encounter for fitting or adjustment of spectacles or contact lenses    Myopia, bilateral    History of laser refractive surgery      Mild stromal haze s/p PRK    Discussed CL charges.  Dispense Final Rx for glasses  Note changes CL Rx  RTC 1 year  Discussed above and answered questions.

## 2018-07-09 ENCOUNTER — OFFICE VISIT (OUTPATIENT)
Dept: PULMONOLOGY | Facility: CLINIC | Age: 38
End: 2018-07-09
Payer: OTHER GOVERNMENT

## 2018-07-09 VITALS
HEIGHT: 71 IN | HEART RATE: 67 BPM | SYSTOLIC BLOOD PRESSURE: 118 MMHG | OXYGEN SATURATION: 98 % | RESPIRATION RATE: 18 BRPM | DIASTOLIC BLOOD PRESSURE: 76 MMHG | WEIGHT: 210.88 LBS | BODY MASS INDEX: 29.52 KG/M2

## 2018-07-09 DIAGNOSIS — G47.33 OSA ON CPAP: Primary | Chronic | ICD-10-CM

## 2018-07-09 DIAGNOSIS — F51.04 PSYCHOPHYSIOLOGICAL INSOMNIA: ICD-10-CM

## 2018-07-09 PROCEDURE — 99999 PR PBB SHADOW E&M-EST. PATIENT-LVL III: CPT | Mod: PBBFAC,,, | Performed by: INTERNAL MEDICINE

## 2018-07-09 PROCEDURE — 99213 OFFICE O/P EST LOW 20 MIN: CPT | Mod: PBBFAC | Performed by: INTERNAL MEDICINE

## 2018-07-09 PROCEDURE — 99214 OFFICE O/P EST MOD 30 MIN: CPT | Mod: S$PBB,,, | Performed by: INTERNAL MEDICINE

## 2018-07-09 RX ORDER — TRAZODONE HYDROCHLORIDE 100 MG/1
100 TABLET ORAL NIGHTLY
Qty: 30 TABLET | Refills: 2 | Status: SHIPPED | OUTPATIENT
Start: 2018-07-09 | End: 2018-09-10 | Stop reason: SDUPTHER

## 2018-07-09 NOTE — PATIENT INSTRUCTIONS
Paroxetine tablets  What is this medicine?  PAROXETINE (pa KRISTI e teen) is used to treat depression. It may also be used to treat anxiety disorders, obsessive compulsive disorder, panic attacks, post traumatic stress, and premenstrual dysphoric disorder (PMDD).  How should I use this medicine?  Take this medicine by mouth with a glass of water. Follow the directions on the prescription label. You can take it with or without food. Take your medicine at regular intervals. Do not take your medicine more often than directed. Do not stop taking this medicine suddenly except upon the advice of your doctor. Stopping this medicine too quickly may cause serious side effects or your condition may worsen.  A special MedGuide will be given to you by the pharmacist with each prescription and refill. Be sure to read this information carefully each time.  Talk to your pediatrician regarding the use of this medicine in children. Special care may be needed.  What side effects may I notice from receiving this medicine?  Side effects that you should report to your doctor or health care professional as soon as possible:  · allergic reactions like skin rash, itching or hives, swelling of the face, lips, or tongue  · black or bloody stools, blood in the urine or vomit  · fast, irregular heartbeat  · hallucination, loss of contact with reality  · painful or prolonged erection (men)  · seizures  · suicidal thoughts or other mood changes  · trouble passing urine or change in the amount of urine  · unusual bleeding or bruising  · unusually weak or tired  · vomiting  Side effects that usually do not require medical attention (report to your doctor or health care professional if they continue or are bothersome):  · change in appetite, weight  · change in sex drive or performance  · constipation or diarrhea  · difficulty sleeping  · drowsy  · headache  · increased sweating  · muscle pain or weakness  · tremors  What may interact with this  medicine?  Do not take this medicine with any of the following medications:  · linezolid  · MAOIs like Carbex, Eldepryl, Marplan, Nardil, and Parnate  · methylene blue (injected into a vein)  · pimozide  · thioridazine  This medicine may also interact with the following medications:  · alcohol  · aspirin and aspirin-like medicines  · atomoxetine  · certain medicines for depression, anxiety, or psychotic disturbances  · certain medicines for irregular heart beat like propafenone, flecainide, encainide, and quinidine  · certain medicines for migraine headache like almotriptan, eletriptan, frovatriptan, naratriptan, rizatriptan, sumatriptan, zolmitriptan  · cimetidine  · digoxin  · diuretics  · fentanyl  · fosamprenavir/ritonavir  · furazolidone  · isoniazid  · lithium  · medicines that treat or prevent blood clots like warfarin, enoxaparin, and dalteparin  · medicines for sleep  · metoprolol  · NSAIDs, medicines for pain and inflammation, like ibuprofen or naproxen  · phenobarbital  · phenytoin  · procarbazine  · procyclidine  · rasagiline  · supplements like [place]'s wort, kava kava, valerian  · tamoxifen  · theophylline  · tramadol  · tryptophan  What if I miss a dose?  If you miss a dose, take it as soon as you can. If it is almost time for your next dose, take only that dose. Do not take double or extra doses.  Where should I keep my medicine?  Keep out of the reach of children.  Store at room temperature between 15 and 30 degrees C (59 and 86 degrees F). Keep container tightly closed. Throw away any unused medicine after the expiration date.  What should I tell my health care provider before I take this medicine?  They need to know if you have any of these conditions:  · bleeding disorders  · glaucoma  · heart disease  · kidney disease  · liver disease  · low levels of sodium in the blood  · sam or bipolar disorder  · seizures  · suicidal thoughts, plans, or attempt; a previous suicide attempt by you or a  family member  · take MAOIs like Carbex, Eldepryl, Marplan, Nardil, and Parnate  · take medicines that treat or prevent blood clots  · an unusual or allergic reaction to paroxetine, other medicines, foods, dyes, or preservatives  · pregnant or trying to get pregnant  · breast-feeding  What should I watch for while using this medicine?  Tell your doctor if your symptoms do not get better or if they get worse. Visit your doctor or health care professional for regular checks on your progress. Because it may take several weeks to see the full effects of this medicine, it is important to continue your treatment as prescribed by your doctor.  Patients and their families should watch out for new or worsening thoughts of suicide or depression. Also watch out for sudden changes in feelings such as feeling anxious, agitated, panicky, irritable, hostile, aggressive, impulsive, severely restless, overly excited and hyperactive, or not being able to sleep. If this happens, especially at the beginning of treatment or after a change in dose, call your health care professional.  You may get drowsy or dizzy. Do not drive, use machinery, or do anything that needs mental alertness until you know how this medicine affects you. Do not stand or sit up quickly, especially if you are an older patient. This reduces the risk of dizzy or fainting spells. Alcohol may interfere with the effect of this medicine. Avoid alcoholic drinks.  Your mouth may get dry. Chewing sugarless gum or sucking hard candy, and drinking plenty of water will help. Contact your doctor if the problem does not go away or is severe.  NOTE:This sheet is a summary. It may not cover all possible information. If you have questions about this medicine, talk to your doctor, pharmacist, or health care provider. Copyright© 2017 [employer]

## 2018-07-09 NOTE — PROGRESS NOTES
Subjective:      Patient ID: Mickey Dominguez is a 37 y.o. male.    Patient Active Problem List   Diagnosis    CLAUDE on CPAP    Psychophysiological insomnia     Problem list has been reviewed.      Chief Complaint: Sleep Apnea       HPI: Follow up for CLAUDE and CPAP complaince assessment.  he  is on AUTOPAP  of 4 - 20 CMWP.     He definitely thinks PAP is beneficial to his health and He wants to continue with PAP therapy.    Patient denies snoring, headaches, excessive daytime sleepiness    [place] Sleepiness Scale       EPWORTH SLEEPINESS SCALE 7/9/2018 1/13/2017   Sitting and reading 2 1   Watching TV 2 1   Sitting, inactive in a public place (e.g. a theatre or a meeting) 0 0   As a passenger in a car for an hour without a break 0 1   Lying down to rest in the afternoon when circumstances permit 0 0   Sitting and talking to someone 0 0   Sitting quietly after a lunch without alcohol 0 0   In a car, while stopped for a few minutes in traffic 0 0   Total score 4 3       He reports symptoms of anxiety - insomnia, chest pains, angry outbursts fort he past 18 months and progressively worsening. He reports that stress at his work has been mounting over the past 18 months.        Previous Report Reviewed: office notes     The following portions of the patient's history were reviewed and updated as appropriate: He  has a past medical history of Allergy; Anxiety; Arthritis; Closed skull fracture with intracranial hemorrhage, with loss of consciousness; Neuromuscular disorder; Obstructive sleep apnea on CPAP; and PTSD (post-traumatic stress disorder).  He  has a past surgical history that includes Rotator cuff repair; menicus repair; and Fracture surgery.  His family history is not on file.  He  reports that he has never smoked. He has never used smokeless tobacco. He reports that he does not drink alcohol or use drugs.  He has a current medication list which includes the following prescription(s): cyclobenzaprine, fluticasone,  "nabumetone, and trazodone.  He has No Known Allergies..    Review of Systems   HENT: Positive for hearing loss.    Respiratory: Positive for apnea.    Cardiovascular: Negative for chest pain and leg swelling.   Musculoskeletal: Positive for arthralgias, back pain and myalgias.   Gastrointestinal: Negative for abdominal pain and abdominal distention.   Neurological: Positive for headaches.   Psychiatric/Behavioral: The patient is nervous/anxious.    All other systems reviewed and are negative.       Objective:     Vitals:    07/09/18 1451   BP: 118/76   Pulse: 67   Resp: 18   SpO2: 98%   Weight: 95.7 kg (210 lb 13.9 oz)   Height: 5' 11" (1.803 m)     body mass index is 29.41 kg/m².    Physical Exam   Constitutional: He appears well-developed and well-nourished.   HENT:   Head: Normocephalic and atraumatic.   Eyes: EOM are normal. Pupils are equal, round, and reactive to light.   Neck: Normal range of motion. Neck supple.   Cardiovascular: Normal rate and regular rhythm.    Pulmonary/Chest: Effort normal and breath sounds normal.   Abdominal: Soft. Bowel sounds are normal.   Musculoskeletal: Normal range of motion.   Neurological: He is alert. He has normal reflexes.   Skin: Skin is warm and dry.   Psychiatric: He has a normal mood and affect.   Nursing note and vitals reviewed.      Personal Diagnostic Review  CPAP complaince download.     Assessment / plan     Discussed diagnosis, its evaluation, treatment and usual course. All questions answered.    Problem List Items Addressed This Visit        Other    CLAUDE on CPAP - Primary    Current Assessment & Plan     Continue CPAP of 13. ([facility])     Discussed therapeutic goals for positive airway pressure therapy(CPAP or BiPAP): Ideal is usage 100% of nights for 6 - 8 hours per night. Minimum usage is 70% of night for at least 4 hours per night used. Pateint expressed understanding. All Questions answered.    CPAP supplies renewed.          Relevant Orders    " CPAP/BIPAP SUPPLIES    Psychophysiological insomnia    Current Assessment & Plan     Start Trazodone 100 mg PO QHS.  Re evaluate in 2 months.           Relevant Medications    traZODone (DESYREL) 100 MG tablet            TIME SPENT WITH PATIENT: Time spent: 25 minutes in face to face  discussion concerning diagnosis, prognosis, review of lab and test results, benefits of treatment as well as management of disease, counseling of patient and coordination of care between various health  care providers . Greater than half the time spent was used for coordination of care and counseling of patient.     Follow-up in about 1 year (around 7/9/2019) for CLAUDE.

## 2018-07-09 NOTE — LETTER
July 9, 2018      Mahsa Traylor MD  [address] [address] [address]           [facility]  [address] [place] [id number]  Phone: [phone]  Fax: [phone]          Patient: Mickey Dominguez   MR Number: [id number]   YOB: 1980   Date of Visit: 7/9/2018       Dear Dr. Mahsa Traylor:    Thank you for referring Mickey Dominguez to me for evaluation. Attached you will find relevant portions of my assessment and plan of care.    If you have questions, please do not hesitate to call me. I look forward to following Mickey Dominguez along with you.    Sincerely,    Jordan Vitale MD    Enclosure  CC:  No Recipients    If you would like to receive this communication electronically, please contact [email or web address] or [phone] to request more information on [employer] Link access.    For providers and/or their staff who would like to refer a patient to [facility], please contact us through our one-stop-shop provider referral line, [facility], at [phone].    If you feel you have received this communication in error or would no longer like to receive these types of communications, please e-mail [email or web address]

## 2018-07-09 NOTE — ASSESSMENT & PLAN NOTE
Continue CPAP of 13. ([facility] [place] [facility])     Discussed therapeutic goals for positive airway pressure therapy(CPAP or BiPAP): Ideal is usage 100% of nights for 6 - 8 hours per night. Minimum usage is 70% of night for at least 4 hours per night used. Pateint expressed understanding. All Questions answered.    CPAP supplies renewed.

## 2018-09-10 ENCOUNTER — OFFICE VISIT (OUTPATIENT)
Dept: PULMONOLOGY | Facility: CLINIC | Age: 38
End: 2018-09-10
Payer: OTHER GOVERNMENT

## 2018-09-10 VITALS
WEIGHT: 212.94 LBS | OXYGEN SATURATION: 97 % | RESPIRATION RATE: 18 BRPM | DIASTOLIC BLOOD PRESSURE: 72 MMHG | SYSTOLIC BLOOD PRESSURE: 120 MMHG | HEIGHT: 71 IN | BODY MASS INDEX: 29.81 KG/M2 | HEART RATE: 84 BPM

## 2018-09-10 DIAGNOSIS — F51.04 PSYCHOPHYSIOLOGICAL INSOMNIA: Chronic | ICD-10-CM

## 2018-09-10 DIAGNOSIS — F51.04 PSYCHOPHYSIOLOGICAL INSOMNIA: ICD-10-CM

## 2018-09-10 DIAGNOSIS — G47.33 OSA ON CPAP: Primary | Chronic | ICD-10-CM

## 2018-09-10 PROCEDURE — 99999 PR PBB SHADOW E&M-EST. PATIENT-LVL III: CPT | Mod: PBBFAC,,, | Performed by: INTERNAL MEDICINE

## 2018-09-10 PROCEDURE — 99214 OFFICE O/P EST MOD 30 MIN: CPT | Mod: S$PBB,,, | Performed by: INTERNAL MEDICINE

## 2018-09-10 PROCEDURE — 99213 OFFICE O/P EST LOW 20 MIN: CPT | Mod: PBBFAC | Performed by: INTERNAL MEDICINE

## 2018-09-10 RX ORDER — TRAZODONE HYDROCHLORIDE 100 MG/1
100 TABLET ORAL NIGHTLY
Qty: 30 TABLET | Refills: 6 | Status: SHIPPED | OUTPATIENT
Start: 2018-09-10 | End: 2020-07-29

## 2018-09-10 NOTE — PATIENT INSTRUCTIONS
Continuous Positive Air Pressure (CPAP)     A mask over the nose gently directs air into the throat to keep the airway open.   Continuous positive air pressure (CPAP) uses gentle air pressure to hold the airway open. CPAP is often the most effective treatment for sleep apnea and severe snoring. It works very well for many people. But keep in mind that it can take several adjustments before the setup is right for you.  How CPAP works  The CPAP machine  is a small portable pump beside the bed. The pump sends air through a hose, which is held over your nose and mouth by a mask. Mild air pressure is gently pushed through your airway. The air pressure nudges sagging tissues aside. This widens the airway so you can breathe better. CPAP may be combined with other kinds of therapy for sleep apnea.  Types of air pressure treatments  There are different types of CPAP. Your doctor or CPAP technician will help you decide which type is best for you:  · Basic CPAP keeps the pressure constant all night long.  · A bilevel device (BiPAP) provides more pressure when you breathe in and less when you breathe out. A BiPAP machine also may be set to provide automatic breaths to maintain breathing if you stop breathing while sleeping.  · An autoCPAP device automatically adjusts pressure throughout the night and in response to changes such as body position, sleep stage, and snoring.  Date Last Reviewed: 8/10/2015  © 6228-3902 [employer], [employer]. [address] [place] [address], [place] [address]. All rights reserved. This information is not intended as a substitute for professional medical care. Always follow your healthcare professional's instructions.

## 2018-09-10 NOTE — PROGRESS NOTES
Subjective:      Patient ID: Mickey Dominguez is a 37 y.o. male.    Patient Active Problem List   Diagnosis    CLAUDE on CPAP    Psychophysiological insomnia     Problem list has been reviewed.      Chief Complaint: Sleep Apnea         HPI: Follow up for CLAUDE and CPAP complaince assessment.   he  is on AUTOPAP 4 - 20 CMWP .     His has not been using his CPAP. His work schedule has recently required multiple deployments.   He definitely thinks PAP is beneficial to his health and he wants to continue with PAP therapy.    Patient denies snoring, headaches, excessive daytime sleepiness    Compliance Summary  8/11/2018 - 9/9/2018 (30 days)  Days with Device Usage 7 days  Days without Device Usage 23 days  Percent Days with Device Usage 23.3%  Cumulative Usage 1 day 13 hrs. 15 mins. 1 secs.  Maximum Usage (1 Day) 6 hrs. 44 mins. 13 secs.  Average Usage (All Days) 1 hrs. 14 mins. 30 secs.  Average Usage (Days Used) 5 hrs. 19 mins. 17 secs.  Minimum Usage (1 Day) 2 hrs. 40 mins. 23 secs.  Percent of Days with Usage >= 4 Hours 16.7%  Percent of Days with Usage < 4 Hours 83.3%  Total Blower Time 1 day 13 hrs. 19 mins. 2 secs.  Average AHI 1.1  Auto-CPAP Summary  Auto-CPAP Mean Pressure 7.9 cmH2O  Auto-CPAP Peak Average Pressure 8.4 cmH2O  Average Device Pressure <= 90% of Time 9.8 cmH2O  Average Time in Large Leak Per Day 17 secs.    [place] Sleepiness Scale       EPWORTH SLEEPINESS SCALE 9/10/2018 7/9/2018 1/13/2017   Sitting and reading 0 2 1   Watching TV 1 2 1   Sitting, inactive in a public place (e.g. a theatre or a meeting) 0 0 0   As a passenger in a car for an hour without a break 0 0 1   Lying down to rest in the afternoon when circumstances permit 0 0 0   Sitting and talking to someone 0 0 0   Sitting quietly after a lunch without alcohol 0 0 0   In a car, while stopped for a few minutes in traffic 0 0 0   Total score 1 4 3     He reports that trazodone helps him initiating sleep.    A full  review of systems, past ,  "family  and social histories was performed except as mentioned in the note above, these are non contributory to the main issues discussed today.     Previous Report Reviewed: office notes     The following portions of the patient's history were reviewed and updated as appropriate: allergies, current medications, past family history, past medical history, past social history, past surgical history and problem list.    Review of Systems   HENT: Positive for hearing loss.    Respiratory: Positive for apnea.    Cardiovascular: Negative for chest pain and leg swelling.   Musculoskeletal: Positive for arthralgias, back pain and myalgias.   Gastrointestinal: Negative for abdominal pain and abdominal distention.   Neurological: Positive for headaches.   Psychiatric/Behavioral: The patient is nervous/anxious.    All other systems reviewed and are negative.       Objective:     Vitals:    09/10/18 0903   BP: 120/72   Pulse: 84   Resp: 18   SpO2: 97%   Weight: 96.6 kg (212 lb 15.4 oz)   Height: 5' 11" (1.803 m)     body mass index is 29.7 kg/m².    Physical Exam   Constitutional: He appears well-developed and well-nourished.   HENT:   Head: Normocephalic and atraumatic.   Eyes: EOM are normal. Pupils are equal, round, and reactive to light.   Neck: Normal range of motion. Neck supple.   Cardiovascular: Normal rate and regular rhythm.   Pulmonary/Chest: Effort normal and breath sounds normal.   Abdominal: Soft. Bowel sounds are normal.   Musculoskeletal: Normal range of motion.   Neurological: He is alert. He has normal reflexes.   Skin: Skin is warm and dry.   Psychiatric: He has a normal mood and affect.   Nursing note and vitals reviewed.      Personal Diagnostic Review  CPAP complaince download.     Assessment / plan     Discussed diagnosis, its evaluation, treatment and usual course. All questions answered.    Problem List Items Addressed This Visit        Other    CLAUDE on CPAP - Primary    Current Assessment & Plan     " Continue CPAP of 13. ([facility] - [facility])     Discussed therapeutic goals for positive airway pressure therapy(CPAP or BiPAP): Ideal is usage 100% of nights for 6 - 8 hours per night. Minimum usage is 70% of night for at least 4 hours per night used. Pateint expressed understanding. All Questions answered.    CPAP supplies renewed.          Psychophysiological insomnia    Current Assessment & Plan     Continue Trazodone 100 mg PO QHS.           Relevant Medications    traZODone (DESYREL) 100 MG tablet            TIME SPENT WITH PATIENT: Time spent: 30 minutes in face to face  discussion concerning diagnosis, prognosis, review of lab and test results, benefits of treatment as well as management of disease, counseling of patient and coordination of care between various health  care providers . Greater than half the time spent was used for coordination of care and counseling of patient.     Follow-up in about 1 year (around 9/10/2019) for CLAUDE.

## 2018-09-10 NOTE — ASSESSMENT & PLAN NOTE
Continue CPAP of 13. ([facility] [place] [facility]     Discussed therapeutic goals for positive airway pressure therapy(CPAP or BiPAP): Ideal is usage 100% of nights for 6 - 8 hours per night. Minimum usage is 70% of night for at least 4 hours per night used. Pateint expressed understanding. All Questions answered.    CPAP supplies renewed.

## 2019-02-13 ENCOUNTER — HOSPITAL ENCOUNTER (OUTPATIENT)
Dept: RADIOLOGY | Facility: HOSPITAL | Age: 39
Discharge: HOME | End: 2019-02-13
Attending: FAMILY MEDICINE
Payer: OTHER GOVERNMENT

## 2019-02-13 ENCOUNTER — OFFICE VISIT (OUTPATIENT)
Dept: FAMILY MEDICINE | Facility: CLINIC | Age: 39
End: 2019-02-13
Payer: OTHER GOVERNMENT

## 2019-02-13 VITALS
DIASTOLIC BLOOD PRESSURE: 86 MMHG | BODY MASS INDEX: 30.13 KG/M2 | RESPIRATION RATE: 16 BRPM | HEIGHT: 71 IN | TEMPERATURE: 98 F | WEIGHT: 215.19 LBS | HEART RATE: 76 BPM | SYSTOLIC BLOOD PRESSURE: 130 MMHG | OXYGEN SATURATION: 98 %

## 2019-02-13 DIAGNOSIS — N50.811 TESTICULAR PAIN, RIGHT: Primary | ICD-10-CM

## 2019-02-13 DIAGNOSIS — M25.562 PAIN IN BOTH KNEES, UNSPECIFIED CHRONICITY: ICD-10-CM

## 2019-02-13 DIAGNOSIS — M54.50 LOW BACK PAIN, NON-SPECIFIC: ICD-10-CM

## 2019-02-13 DIAGNOSIS — F41.9 ANXIETY: ICD-10-CM
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
## 2019-02-13 DIAGNOSIS — M54.50 CHRONIC LOW BACK PAIN WITHOUT SCIATICA, UNSPECIFIED BACK PAIN LATERALITY: ICD-10-CM

## 2019-02-13 DIAGNOSIS — F43.10 PTSD (POST-TRAUMATIC STRESS DISORDER): ICD-10-CM

## 2019-02-13 DIAGNOSIS — M25.561 PAIN IN BOTH KNEES, UNSPECIFIED CHRONICITY: ICD-10-CM

## 2019-02-13 DIAGNOSIS — M62.830 LUMBAR PARASPINAL MUSCLE SPASM: ICD-10-CM

## 2019-02-13 DIAGNOSIS — G89.29 CHRONIC LOW BACK PAIN WITHOUT SCIATICA, UNSPECIFIED BACK PAIN LATERALITY: ICD-10-CM

## 2019-02-13 LAB
BILIRUB SERPL-MCNC: NORMAL MG/DL
BLOOD URINE, POC: NORMAL
COLOR, POC UA: YELLOW
GLUCOSE UR QL STRIP: NORMAL
KETONES UR QL STRIP: NORMAL
LEUKOCYTE ESTERASE URINE, POC: NORMAL
NITRITE, POC UA: NORMAL
PH, POC UA: 5
PROTEIN, POC: NORMAL
SPECIFIC GRAVITY, POC UA: 1.02
UROBILINOGEN, POC UA: NORMAL

## 2019-02-13 PROCEDURE — 73564 X-RAY EXAM KNEE 4 OR MORE: CPT | Mod: 26,50,, | Performed by: RADIOLOGY

## 2019-02-13 PROCEDURE — 99999 PR PBB SHADOW E&M-EST. PATIENT-LVL IV: CPT | Mod: PBBFAC,,, | Performed by: FAMILY MEDICINE

## 2019-02-13 PROCEDURE — 73564 XR KNEE ORTHO BILAT WITH FLEXION: ICD-10-PCS | Mod: 26,50,, | Performed by: RADIOLOGY

## 2019-02-13 PROCEDURE — 72110 XR LUMBAR SPINE COMPLETE 5 VIEW: ICD-10-PCS | Mod: 26,,, | Performed by: RADIOLOGY

## 2019-02-13 PROCEDURE — 99214 PR OFFICE/OUTPT VISIT, EST, LEVL IV, 30-39 MIN: ICD-10-PCS | Mod: S$PBB,,, | Performed by: FAMILY MEDICINE

## 2019-02-13 PROCEDURE — 72110 X-RAY EXAM L-2 SPINE 4/>VWS: CPT | Mod: TC,PO

## 2019-02-13 PROCEDURE — 72110 X-RAY EXAM L-2 SPINE 4/>VWS: CPT | Mod: 26,,, | Performed by: RADIOLOGY

## 2019-02-13 PROCEDURE — 99214 OFFICE O/P EST MOD 30 MIN: CPT | Mod: PBBFAC,PO | Performed by: FAMILY MEDICINE

## 2019-02-13 PROCEDURE — 81002 URINALYSIS NONAUTO W/O SCOPE: CPT | Mod: PBBFAC,PO | Performed by: FAMILY MEDICINE

## 2019-02-13 PROCEDURE — 99999 PR PBB SHADOW E&M-EST. PATIENT-LVL IV: ICD-10-PCS | Mod: PBBFAC,,, | Performed by: FAMILY MEDICINE

## 2019-02-13 PROCEDURE — 73564 X-RAY EXAM KNEE 4 OR MORE: CPT | Mod: TC,50,PO

## 2019-02-13 PROCEDURE — 99214 OFFICE O/P EST MOD 30 MIN: CPT | Mod: S$PBB,,, | Performed by: FAMILY MEDICINE

## 2019-02-13 RX ORDER — NAPROXEN 500 MG/1
500 TABLET ORAL 2 TIMES DAILY
Qty: 60 TABLET | Refills: 2 | Status: SHIPPED | OUTPATIENT
Start: 2019-02-13

## 2019-02-13 RX ORDER — CYCLOBENZAPRINE HCL 10 MG
10 TABLET ORAL NIGHTLY
Qty: 90 TABLET | Refills: 1 | Status: SHIPPED | OUTPATIENT
Start: 2019-02-13 | End: 2020-07-29

## 2019-02-13 RX ORDER — BACLOFEN 10 MG/1
TABLET ORAL
Refills: 0 | COMMUNITY
Start: 2019-02-03 | End: 2019-02-13 | Stop reason: ALTCHOICE

## 2019-02-13 RX ORDER — NAPROXEN SODIUM 550 MG/1
TABLET ORAL
Refills: 0 | COMMUNITY
Start: 2019-02-03 | End: 2019-02-13 | Stop reason: SDUPTHER
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
## 2020-07-29 ENCOUNTER — OFFICE VISIT (OUTPATIENT)
Dept: ORTHOPEDICS | Facility: CLINIC | Age: 40
End: 2020-07-29
Payer: OTHER GOVERNMENT

## 2020-07-29 ENCOUNTER — OFFICE VISIT (OUTPATIENT)
Dept: FAMILY MEDICINE | Facility: CLINIC | Age: 40
End: 2020-07-29
Payer: OTHER GOVERNMENT

## 2020-07-29 ENCOUNTER — HOSPITAL ENCOUNTER (OUTPATIENT)
Dept: RADIOLOGY | Facility: HOSPITAL | Age: 40
Discharge: HOME | End: 2020-07-29
Attending: PHYSICIAN ASSISTANT
Payer: OTHER GOVERNMENT

## 2020-07-29 VITALS
WEIGHT: 188.94 LBS | SYSTOLIC BLOOD PRESSURE: 120 MMHG | DIASTOLIC BLOOD PRESSURE: 80 MMHG | HEIGHT: 71 IN | RESPIRATION RATE: 16 BRPM | OXYGEN SATURATION: 98 % | TEMPERATURE: 97 F | HEART RATE: 64 BPM | BODY MASS INDEX: 26.45 KG/M2

## 2020-07-29 VITALS
HEART RATE: 52 BPM | WEIGHT: 188 LBS | SYSTOLIC BLOOD PRESSURE: 135 MMHG | HEIGHT: 71 IN | DIASTOLIC BLOOD PRESSURE: 90 MMHG | BODY MASS INDEX: 26.32 KG/M2

## 2020-07-29 DIAGNOSIS — M25.512 ACUTE PAIN OF LEFT SHOULDER: Primary | ICD-10-CM

## 2020-07-29 DIAGNOSIS — M25.511 RIGHT SHOULDER PAIN, UNSPECIFIED CHRONICITY: Primary | ICD-10-CM

## 2020-07-29 DIAGNOSIS — M25.511 RIGHT SHOULDER PAIN, UNSPECIFIED CHRONICITY: ICD-10-CM

## 2020-07-29 DIAGNOSIS — Z98.890 HISTORY OF REPAIR OF LEFT ROTATOR CUFF: ICD-10-CM

## 2020-07-29 DIAGNOSIS — R09.82 POST-NASAL DRAINAGE: ICD-10-CM

## 2020-07-29 DIAGNOSIS — M67.912 DYSFUNCTION OF LEFT ROTATOR CUFF: ICD-10-CM

## 2020-07-29 PROCEDURE — 99214 PR OFFICE/OUTPT VISIT, EST, LEVL IV, 30-39 MIN: ICD-10-PCS | Mod: S$PBB,,, | Performed by: FAMILY MEDICINE

## 2020-07-29 PROCEDURE — 99999 PR PBB SHADOW E&M-EST. PATIENT-LVL III: CPT | Mod: PBBFAC,,, | Performed by: PHYSICIAN ASSISTANT

## 2020-07-29 PROCEDURE — 99999 PR PBB SHADOW E&M-EST. PATIENT-LVL IV: CPT | Mod: PBBFAC,,, | Performed by: FAMILY MEDICINE

## 2020-07-29 PROCEDURE — 73030 X-RAY EXAM OF SHOULDER: CPT | Mod: 26,LT,, | Performed by: RADIOLOGY

## 2020-07-29 PROCEDURE — 99214 OFFICE O/P EST MOD 30 MIN: CPT | Mod: S$PBB,,, | Performed by: FAMILY MEDICINE

## 2020-07-29 PROCEDURE — 99202 PR OFFICE/OUTPT VISIT, NEW, LEVL II, 15-29 MIN: ICD-10-PCS | Mod: S$PBB,,, | Performed by: PHYSICIAN ASSISTANT

## 2020-07-29 PROCEDURE — 99214 OFFICE O/P EST MOD 30 MIN: CPT | Mod: PBBFAC,PO | Performed by: FAMILY MEDICINE

## 2020-07-29 PROCEDURE — 99999 PR PBB SHADOW E&M-EST. PATIENT-LVL IV: ICD-10-PCS | Mod: PBBFAC,,, | Performed by: FAMILY MEDICINE

## 2020-07-29 PROCEDURE — 99213 OFFICE O/P EST LOW 20 MIN: CPT | Mod: PBBFAC,25,27 | Performed by: PHYSICIAN ASSISTANT

## 2020-07-29 PROCEDURE — 99999 PR PBB SHADOW E&M-EST. PATIENT-LVL III: ICD-10-PCS | Mod: PBBFAC,,, | Performed by: PHYSICIAN ASSISTANT

## 2020-07-29 PROCEDURE — 99202 OFFICE O/P NEW SF 15 MIN: CPT | Mod: S$PBB,,, | Performed by: PHYSICIAN ASSISTANT

## 2020-07-29 PROCEDURE — 73030 X-RAY EXAM OF SHOULDER: CPT | Mod: TC,LT

## 2020-07-29 PROCEDURE — 73030 XR SHOULDER COMPLETE 2 OR MORE VIEWS LEFT: ICD-10-PCS | Mod: 26,LT,, | Performed by: RADIOLOGY

## 2020-07-29 RX ORDER — HYDROCODONE BITARTRATE AND ACETAMINOPHEN 5; 325 MG/1; MG/1
TABLET ORAL
COMMUNITY
Start: 2020-07-28

## 2020-07-29 RX ORDER — BENZONATATE 200 MG/1
200 CAPSULE ORAL 3 TIMES DAILY PRN
Qty: 21 CAPSULE | Refills: 0 | Status: SHIPPED | OUTPATIENT
Start: 2020-07-29 | End: 2020-08-08

## 2020-07-29 NOTE — LETTER
July 29, 2020      Mahsa Traylor MD  [address] [facility] [address]           [facility]  [address] [place] [address]  Phone: [phone]  Fax: [phone]          Patient: Mickey Dominguez   MR Number: [id number]   YOB: 1980   Date of Visit: 7/29/2020       Dear Dr. Mahsa Traylor:    Thank you for referring Mickey Dominguez to me for evaluation. Attached you will find relevant portions of my assessment and plan of care.    If you have questions, please do not hesitate to call me. I look forward to following Mickey Dominguez along with you.    Sincerely,    CHARLOTTE Virkosure  CC:  No Recipients    If you would like to receive this communication electronically, please contact [email or web address] or [phone] to request more information on [facility] access.    For providers and/or their staff who would like to refer a patient to [facility], please contact us through our one-stop-shop provider referral line, [facility] , at [phone].    If you feel you have received this communication in error or would no longer like to receive these types of communications, please e-mail [email or web address]         
Private car

## 2020-07-29 NOTE — LETTER
July 29, 2020    Mickey Dominguez  [address]  [facility] [address]             [facility]  Family Medicine  [address]  [facility] [address]  Phone: [phone]  Fax: [phone]   July 29, 2020     Patient: Mickey Dominguez   YOB: 1980   Date of Visit: 7/29/2020       To Whom it May Concern:    Mickey Dominguez was seen in Dr Allen clinic on 7/29/20. He can not cause any strain to his left arm until he sees his orthopedic        Mahsa Traylor MD

## 2020-07-29 NOTE — PROGRESS NOTES
Subjective:      Patient ID: Mickey Dominguez is a 39 y.o. male.    Chief Complaint: Left shoulder    HPI: Mickey Dominguez  is a 39 y.o. male who c/o Left shoulder for duration of 1 day.  He was doing a burpee yesterday when he felt a painful pop in the left shoulder.  He was seen at Urgent Care in [place].  He had x-rays there which were negative per his report.  He does not have them with him today.  He was given a prescription for naproxen as well as Norco.  He still has plenty of both medications.  He was seen by primary care this afternoon who ordered an MRI in asked us to see him today.  Pain level is 5/10 in severity.  Quality is aching, throbbing, sharp, burning.  He had some associated numbness and tingling in the hand yesterday.  He says he has not noticed that today.  Alleviating factors include immobilization.  Aggravating factors include movement at and above shoulder level.  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  Of note, he has history of a rotator cuff repair on this side done back in 1999.    Past Medical History:   Diagnosis Date    Allergy     Anxiety     Arthritis     Closed skull fracture with intracranial hemorrhage, with loss of consciousness     over 24 hour period    Neuromuscular disorder     Obstructive sleep apnea on CPAP     PTSD (post-traumatic stress disorder)      Past Surgical History:   Procedure Laterality Date    FRACTURE SURGERY      left femur    menicus repair      ROTATOR CUFF REPAIR      left     History reviewed. No pertinent family history.  Social History     Socioeconomic History    Marital status:      Spouse name: Not on file    Number of children: Not on file    Years of education: Not on file    Highest education level: Not on file   Occupational History    Not on file   Social Needs    Financial resource strain: Not on file     Food insecurity     Worry: Not on file     Inability: Not on file    Transportation needs     Medical: Not on file     Non-medical: Not on file   Tobacco Use    Smoking status: Never Smoker    Smokeless tobacco: Never Used   Substance and Sexual Activity    Alcohol use: No    Drug use: No    Sexual activity: Yes     Partners: Female   Lifestyle    Physical activity     Days per week: Not on file     Minutes per session: Not on file    Stress: Not on file   Relationships    Social connections     Talks on phone: Not on file     Gets together: Not on file     Attends Islam service: Not on file     Active member of club or organization: Not on file     Attends meetings of clubs or organizations: Not on file     Relationship status: Not on file   Other Topics Concern    Not on file   Social History Narrative    Not on file     Medication List with Changes/Refills   Current Medications    BENZONATATE (TESSALON) 200 MG CAPSULE    Take 1 capsule (200 mg total) by mouth 3 (three) times daily as needed.    HYDROCODONE-ACETAMINOPHEN (NORCO) 5-325 MG PER TABLET        NAPROXEN (NAPROSYN) 500 MG TABLET    Take 1 tablet (500 mg total) by mouth 2 (two) times daily.     Review of patient's allergies indicates:  No Known Allergies    Review of Systems   Constitution: Negative for fever.   Cardiovascular: Negative for chest pain.   Respiratory: Negative for cough and shortness of breath.    Skin: Negative for rash.   Musculoskeletal: Positive for joint pain and stiffness. Negative for joint swelling.   Gastrointestinal: Negative for heartburn.   Neurological: Negative for headaches and numbness.         Objective:        General    Nursing note and vitals reviewed.  Constitutional: He is oriented to person, place, and time. He appears well-developed and well-nourished.   HENT:   Head: Normocephalic and atraumatic.   Eyes: EOM are normal.   Cardiovascular: Normal rate and regular rhythm.    Pulmonary/Chest: Effort  normal.   Abdominal: Soft.   Neurological: He is alert and oriented to person, place, and time.   Psychiatric: He has a normal mood and affect. His behavior is normal.         Right Shoulder Exam     Inspection/Observation   Swelling: absent  Bruising: absent  Scars: absent  Deformity: absent  Scapular Dyskinesia: negative    Range of Motion   Active abduction: normal   Passive abduction: normal   Extension: abnormal   Forward Flexion: abnormal   Forward Elevation: abnormal  Adduction: abnormal  External Rotation 0 degrees: abnormal   Internal rotation 0 degrees: normal     Other   Sensation: normal    Left Shoulder Exam     Inspection/Observation   Swelling: absent  Bruising: absent  Scars: absent  Deformity: absent  Scapular Dyskinesia: negative    Range of Motion   Active abduction:  90 normal   Passive abduction:  100 normal   Extension: abnormal   Forward Flexion:  90 abnormal   Forward Elevation: abnormal  Adduction: abnormal  External Rotation 0 degrees: normal   Internal rotation 0 degrees:  Sacrum normal     Tests & Signs   Cross arm: negative  Rotator Cuff Painful Arc/Range: severe    Other   Sensation: normal     Comments:  TTP posteriorly  Unable to assess labral and impingement signs due to painful and limited range of motion      Muscle Strength   Right Upper Extremity   Shoulder Abduction: 5/5   Shoulder Internal Rotation: 5/5   Left Upper Extremity  Shoulder Abduction: 5/5   Shoulder Internal Rotation: 5/5   Shoulder External Rotation: 4/5     Vascular Exam     Right Pulses      Radial:                    2+      Left Pulses      Radial:                    2+      Capillary Refill  Right Hand: normal capillary refill  Left Hand: normal capillary refill              Xray images and report were reviewed today.  I agree with the radiologist's interpretation.    X-Ray Shoulder 2 or More Views Left  Narrative: EXAMINATION:  XR SHOULDER COMPLETE 2 OR MORE VIEWS LEFT    CLINICAL HISTORY:  Pain in right  shoulder    TECHNIQUE:  Two or three views of the left shoulder were performed.    COMPARISON:  None    FINDINGS:  Articulation maintained at the AC and glenohumeral joints. No fracture, dislocation or evidence of abnormal soft tissue calcification. No radiopaque foreign body.  Included left upper lung field is clear. Remaining visualized osseous structures intact.  Impression: As above.    Electronically signed by: Perry Link MD  Date:    07/29/2020  Time:    16:18        Assessment:       Encounter Diagnoses   Name Primary?    Acute pain of left shoulder Yes    Dysfunction of left rotator cuff           Plan:       Mickey was seen today for pain.    Diagnoses and all orders for this visit:    Acute pain of left shoulder    Dysfunction of left rotator cuff        Mickey Dominguez is a new pt who comes in today for the above problems.  I did have the patient get an x-ray today.  It is negative for acute bony abnormality.  No evidence of calcific tendinitis.  Obviously cannot detect a rotator cuff injury based off of an x-ray.  I am concur with the decision to order an MRI.  He should return to the clinic after the MRI is completed so that we can discuss further treatment options at that time.  As he already has naproxen and pain medication, he does not need any additional prescriptions for me.  I have given him a note to return to work tomorrow without using his left upper extremity.  I have encouraged him to get out of the sling and work range of motion within his comfort zone.  He verbalizes understanding and agrees.    Follow up for MRI results.          The patient understands, chooses and consents to this plan and accepts all   the risks which include but are not limited to the risks mentioned above.     Disclaimer: This note was prepared using a voice recognition system and is likely to have sound alike errors within the text.
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
## 2020-08-03 ENCOUNTER — HOSPITAL ENCOUNTER (OUTPATIENT)
Dept: RADIOLOGY | Facility: HOSPITAL | Age: 40
Discharge: HOME | End: 2020-08-03
Attending: FAMILY MEDICINE
Payer: OTHER GOVERNMENT

## 2020-08-03 DIAGNOSIS — M25.512 ACUTE PAIN OF LEFT SHOULDER: ICD-10-CM

## 2020-08-03 DIAGNOSIS — Z98.890 HISTORY OF REPAIR OF LEFT ROTATOR CUFF: ICD-10-CM

## 2020-08-03 PROCEDURE — 73221 MRI JOINT UPR EXTREM W/O DYE: CPT | Mod: 26,LT,, | Performed by: RADIOLOGY

## 2020-08-03 PROCEDURE — 73221 MRI SHOULDER WITHOUT CONTRAST LEFT: ICD-10-PCS | Mod: 26,LT,, | Performed by: RADIOLOGY

## 2020-08-03 PROCEDURE — 73221 MRI JOINT UPR EXTREM W/O DYE: CPT | Mod: TC,PO,LT

## 2020-08-04 ENCOUNTER — TELEPHONE (OUTPATIENT)
Dept: FAMILY MEDICINE | Facility: CLINIC | Age: 40
End: 2020-08-04

## 2020-08-04 NOTE — TELEPHONE ENCOUNTER
----- Message from Kevin Worrell sent at 8/4/2020  4:49 PM CDT -----  Contact: self  Type:  Patient Returning Call    Who Called:Mickey Dominguez   Who Left Message for Patient:nurse  Does the patient know what this is regarding?:MRI results  Would the patient rather a call back or a response via [facility]? Call back  Best Call Back Number:[phone]  Additional Information:

## 2020-08-04 NOTE — TELEPHONE ENCOUNTER
----- Message from Cathryn Cody sent at 8/4/2020  2:48 PM CDT -----  Contact: AIXA  .Type:  Test Results    Who Called: [facility]IXA  Name of Test (Lab/Mammo/Etc): MRI  Date of Test: 08/03/20  Ordering Provider: ANNELISE  Where the test was performed: [facility]  Would the patient rather a call back or a response via [facility]? CALL BACK  Best Call Back Number: [phone]  Additional Information:

## 2020-08-05 ENCOUNTER — OFFICE VISIT (OUTPATIENT)
Dept: ORTHOPEDICS | Facility: CLINIC | Age: 40
End: 2020-08-05
Payer: OTHER GOVERNMENT

## 2020-08-05 VITALS
SYSTOLIC BLOOD PRESSURE: 137 MMHG | BODY MASS INDEX: 26.32 KG/M2 | DIASTOLIC BLOOD PRESSURE: 93 MMHG | HEART RATE: 57 BPM | WEIGHT: 188 LBS | HEIGHT: 71 IN

## 2020-08-05 DIAGNOSIS — M25.512 ACUTE PAIN OF LEFT SHOULDER: ICD-10-CM

## 2020-08-05 DIAGNOSIS — Z98.890 HISTORY OF REPAIR OF LEFT ROTATOR CUFF: ICD-10-CM

## 2020-08-05 DIAGNOSIS — S46.012D TRAUMATIC INCOMPLETE TEAR OF LEFT ROTATOR CUFF, SUBSEQUENT ENCOUNTER: Primary | ICD-10-CM

## 2020-08-05 PROCEDURE — 99214 OFFICE O/P EST MOD 30 MIN: CPT | Mod: PBBFAC,25 | Performed by: PHYSICIAN ASSISTANT

## 2020-08-05 PROCEDURE — 99214 PR OFFICE/OUTPT VISIT, EST, LEVL IV, 30-39 MIN: ICD-10-PCS | Mod: 25,S$PBB,, | Performed by: PHYSICIAN ASSISTANT

## 2020-08-05 PROCEDURE — 99999 PR PBB SHADOW E&M-EST. PATIENT-LVL IV: ICD-10-PCS | Mod: PBBFAC,,, | Performed by: PHYSICIAN ASSISTANT

## 2020-08-05 PROCEDURE — 20610 DRAIN/INJ JOINT/BURSA W/O US: CPT | Mod: S$PBB,LT,, | Performed by: PHYSICIAN ASSISTANT

## 2020-08-05 PROCEDURE — 99214 OFFICE O/P EST MOD 30 MIN: CPT | Mod: 25,S$PBB,, | Performed by: PHYSICIAN ASSISTANT

## 2020-08-05 PROCEDURE — 99999 PR PBB SHADOW E&M-EST. PATIENT-LVL IV: CPT | Mod: PBBFAC,,, | Performed by: PHYSICIAN ASSISTANT

## 2020-08-05 PROCEDURE — 20610 LARGE JOINT ASPIRATION/INJECTION: L SUBACROMIAL BURSA: ICD-10-PCS | Mod: S$PBB,LT,, | Performed by: PHYSICIAN ASSISTANT

## 2020-08-05 PROCEDURE — 20610 DRAIN/INJ JOINT/BURSA W/O US: CPT | Mod: PBBFAC | Performed by: PHYSICIAN ASSISTANT

## 2020-08-05 RX ORDER — METHYLPREDNISOLONE ACETATE 80 MG/ML
80 INJECTION, SUSPENSION INTRA-ARTICULAR; INTRALESIONAL; INTRAMUSCULAR; SOFT TISSUE
Status: DISCONTINUED | OUTPATIENT
Start: 2020-08-05 | End: 2020-08-05 | Stop reason: HOSPADM

## 2020-08-05 RX ADMIN — METHYLPREDNISOLONE ACETATE 80 MG: 80 INJECTION, SUSPENSION INTRALESIONAL; INTRAMUSCULAR; INTRASYNOVIAL; SOFT TISSUE at 02:08

## 2020-08-05 NOTE — PROCEDURES
Large Joint Aspiration/Injection: L subacromial bursa    Date/Time: 8/5/2020 2:00 PM  Performed by: Gricel Rivera PA-C  Authorized by: Gricel Rivera PA-C     Consent Done?:  Yes (Verbal)  Indications:  Pain  Site marked: the procedure site was marked    Timeout: prior to procedure the correct patient, procedure, and site was verified    Prep: patient was prepped and draped in usual sterile fashion      Local anesthesia used?: Yes    Local anesthetic:  Lidocaine 1% without epinephrine  Anesthetic total (ml):  2      Details:  Needle Size:  22 G  Ultrasonic Guidance for needle placement?: No    Approach:  Posterior  Location:  Shoulder  Site:  L subacromial bursa  Medications:  80 mg methylPREDNISolone acetate 80 mg/mL  Patient tolerance:  Patient tolerated the procedure well with no immediate complications     After verbal consent was obtained for left shoulder injection, patient ID, site, and side were verified.  The left shoulder was sterilly prepped in the standard fashion.  A 22-gauge needle was introduced into left subacromial space from the posterior portal approach without complication. The left shoulder was then injected with 20 mg lidocaine plain and 80 mg depomedrol.  A sterile bandaid was applied.  The patient was informed to apply an ice pack approximately 10min once arriving home and not to do anything strenuous for 24hours. He was instructed to call if there were any problems.   Elevation of glucose in diabetic patients was discussed.  The patient was discharged in stable condition.

## 2020-08-05 NOTE — PROGRESS NOTES
Patient ID: Mickey Dominguez is a 39 y.o. male.    Chief Complaint: Pain of the Left Shoulder      HPI: Mickey Dominguez  is a 39 y.o. male who c/o Pain of the Left Shoulder   for duration of about a week.  He had a MRI of his left shoulder and comes in today to go over those results.  Pain and range of motion is slowly improving.  Pain level today is 3/10.  Quality is aching and throbbing.  He still has difficulty with worsening pain when he raises the arm overhead.  Also worsened nighttime.  Improved with ice, rest, ibuprofen.  He again denies radiating symptoms.    He has a history of rotator cuff repair on this side back in the 1990s.  He says that periodically throughout the year the shoulder gives him problems.  He is active .  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    Past Medical History:   Diagnosis Date    Allergy     Anxiety     Arthritis     Closed skull fracture with intracranial hemorrhage, with loss of consciousness     over 24 hour period    Neuromuscular disorder     Obstructive sleep apnea on CPAP     PTSD (post-traumatic stress disorder)      Past Surgical History:   Procedure Laterality Date    FRACTURE SURGERY      left femur    menicus repair      ROTATOR CUFF REPAIR      left     History reviewed. No pertinent family history.  Social History     Socioeconomic History    Marital status:      Spouse name: Not on file    Number of children: Not on file    Years of education: Not on file    Highest education level: Not on file   Occupational History    Not on file   Social Needs    Financial resource strain: Not on file    Food insecurity     Worry: Not on file     Inability: Not on file    Transportation needs     Medical: Not on file     Non-medical: Not on file   Tobacco Use    Smoking status: Never Smoker    Smokeless tobacco: Never Used   Substance and Sexual Activity    Alcohol use: No     Drug use: No    Sexual activity: Yes     Partners: Female   Lifestyle    Physical activity     Days per week: Not on file     Minutes per session: Not on file    Stress: Not on file   Relationships    Social connections     Talks on phone: Not on file     Gets together: Not on file     Attends Latter day service: Not on file     Active member of club or organization: Not on file     Attends meetings of clubs or organizations: Not on file     Relationship status: Not on file   Other Topics Concern    Not on file   Social History Narrative    Not on file     Medication List with Changes/Refills   Current Medications    BENZONATATE (TESSALON) 200 MG CAPSULE    Take 1 capsule (200 mg total) by mouth 3 (three) times daily as needed.    HYDROCODONE-ACETAMINOPHEN (NORCO) 5-325 MG PER TABLET        NAPROXEN (NAPROSYN) 500 MG TABLET    Take 1 tablet (500 mg total) by mouth 2 (two) times daily.     Review of patient's allergies indicates:  No Known Allergies    Objective:        General    Nursing note and vitals reviewed.  Constitutional: He is oriented to person, place, and time. He appears well-developed and well-nourished.   HENT:   Head: Normocephalic and atraumatic.   Eyes: EOM are normal.   Cardiovascular: Normal rate and regular rhythm.    Pulmonary/Chest: Effort normal and breath sounds normal.   Abdominal: Soft.   Neurological: He is alert and oriented to person, place, and time.   Psychiatric: He has a normal mood and affect. His behavior is normal.         Right Shoulder Exam     Inspection/Observation   Swelling: absent  Bruising: absent  Scars: absent  Deformity: absent  Scapular Dyskinesia: negative    Range of Motion   Active abduction: normal   Passive abduction: normal   Extension: normal   Forward Flexion: normal   Forward Elevation: normal  Adduction: normal  External Rotation 0 degrees: normal   Internal rotation 0 degrees: normal     Tests & Signs   Cross arm: negative  Rotator Cuff Painful  Arc/Range: severe    Other   Sensation: normal    Left Shoulder Exam     Inspection/Observation   Swelling: absent  Bruising: absent  Scars: absent  Deformity: absent  Scapular Dyskinesia: negative    Tenderness   The patient is tender to palpation of the greater tuberosity.    Range of Motion   Active abduction:  100 abnormal   Passive abduction:  150 abnormal   Extension: abnormal   Forward Flexion:  160 abnormal   Forward Elevation: abnormal  Adduction: abnormal  External Rotation 0 degrees: normal   Internal rotation 0 degrees:  L5 abnormal     Tests & Signs   Cross arm: negative  Drop arm: negative  Frias test: positive  Impingement: positive  Rotator Cuff Painful Arc/Range: mild  Active Compression Test (Lancaster's Sign): positive  Speed's Test: positive    Other   Sensation: normal       Muscle Strength   Right Upper Extremity   Shoulder Abduction: 5/5   Shoulder Internal Rotation: 5/5   Shoulder External Rotation: 5/5   Left Upper Extremity  Shoulder Abduction: 4/5   Shoulder Internal Rotation: 5/5   Shoulder External Rotation: 4/5     Vascular Exam     Right Pulses      Radial:                    2+      Left Pulses      Radial:                    2+      Capillary Refill  Right Hand: normal capillary refill  Left Hand: normal capillary refill      Images and report were reviewed today.  I agree with the radiologist's interpretation.    MRI Shoulder Without Contrast Left  Narrative: EXAMINATION:  MRI SHOULDER WITHOUT CONTRAST LEFT    CLINICAL HISTORY:  Shoulder pain, rotator cuff disorder suspected, nondiagnostic xray;  Pain in left shoulder    TECHNIQUE:  Multisequence, multiplanar MR imaging of the shoulder performed without contrast.    COMPARISON:  07/29/2020    FINDINGS:  Rotator cuff:    Supraspinatus: There is a tiny articular sided tear involving the anterior fibers of the supraspinatus tendon near the insertion site    Infraspinatus: Intact    Subscapularis: Intact    Teres minor: Intact    Muscle  bulk is maintained.    Labrum: Evaluation is significantly limited by lack of intra-articular contrast.  No perilabral cyst or gross labral deformity.    Biceps: Long head biceps tendon is intact.    Bone: No fracture present.  Bone marrow signal is unremarkable.  Some lateral downsloping of the acromion is appreciated.    Acromioclavicular joint:Unremarkable    Cartilage: Articular cartilage of the glenohumeral joint is preserved    Miscellaneous: No significant joint effusion.  Subdeltoid bursitis.  Impression: Very small articular sided tear involving the anterior fibers of the supraspinatus tendon near the insertion site.  Other findings as above.    Electronically signed by: Leighton Sagastume MD  Date:    08/03/2020  Time:    11:10        Assessment:       Encounter Diagnoses   Name Primary?    Acute pain of left shoulder     Traumatic incomplete tear of left rotator cuff, subsequent encounter Yes    History of repair of left rotator cuff           Plan:       Mickey was seen today for pain.    Diagnoses and all orders for this visit:    Traumatic incomplete tear of left rotator cuff, subsequent encounter  -     Ambulatory referral/consult to Physical/Occupational Therapy; Future    Acute pain of left shoulder  -     Ambulatory referral/consult to Orthopedics  -     Ambulatory referral/consult to Physical/Occupational Therapy; Future    History of repair of left rotator cuff  -     Ambulatory referral/consult to Physical/Occupational Therapy; Future        Mickey Dominguez is an established pt who comes in today for follow-up on the MRI.  He has a partial articular sided tear of the rotator cuff.  Otherwise, range of motion and strength are improving as is his pain.  I discussed his MRI results with Dr. Fiore earlier this week.  He concurred with optimizing conservative treatment in the form of oral anti-inflammatory medication, corticosteroid injection, and follow-up with Dr. pipe sifuentes and about 6 weeks.  If  at that time the patient is not improving I would defer further treatment recommendations to Dr. barrera SANTOS.  Patient verbalizes understanding and agrees.    Follow up in about 6 weeks (around 9/16/2020) for consult with Dr. Ghotra.    The patient understands, chooses and consents to this plan and accepts all   the risks which include but are not limited to the risks mentioned above.     Disclaimer: This note was prepared using a voice recognition system and is likely to have sound alike errors within the text.

## 2020-08-05 NOTE — LETTER
August 5, 2020      Mahsa Traylor MD  [address] [facility] [address]           [facility]  [address] [place] [id number]  Phone: [phone]  Fax: [phone]          Patient: Mickey Dominguez   MR Number: [id number]   YOB: 1980   Date of Visit: 8/5/2020       Dear Dr. Mahsa Traylor:    Thank you for referring Mickey Dominguez to me for evaluation. Attached you will find relevant portions of my assessment and plan of care.    If you have questions, please do not hesitate to call me. I look forward to following Mickey Dominguez along with you.    Sincerely,    CHARLOTTE Virkosure  CC:  No Recipients    If you would like to receive this communication electronically, please contact [email or web address] or [phone] to request more information on [facility] access.    For providers and/or their staff who would like to refer a patient to [facility], please contact us through our one-stop-shop provider referral line, [facility] , at [phone].    If you feel you have received this communication in error or would no longer like to receive these types of communications, please e-mail [email or web address]

## 2020-08-26 ENCOUNTER — TELEPHONE (OUTPATIENT)
Dept: ORTHOPEDICS | Facility: CLINIC | Age: 40
End: 2020-08-26
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
## 2024-06-26 ENCOUNTER — OFFICE VISIT (OUTPATIENT)
Dept: FAMILY MEDICINE | Facility: CLINIC | Age: 44
End: 2024-06-26
Payer: OTHER GOVERNMENT

## 2024-06-26 VITALS
BODY MASS INDEX: 27.75 KG/M2 | HEIGHT: 71 IN | DIASTOLIC BLOOD PRESSURE: 92 MMHG | WEIGHT: 198.19 LBS | OXYGEN SATURATION: 98 % | HEART RATE: 70 BPM | SYSTOLIC BLOOD PRESSURE: 146 MMHG

## 2024-06-26 DIAGNOSIS — B34.9 VIRAL ARTHRITIS: Primary | ICD-10-CM

## 2024-06-26 DIAGNOSIS — M01.X0 VIRAL ARTHRITIS: Primary | ICD-10-CM

## 2024-06-26 DIAGNOSIS — R10.32 LEFT INGUINAL PAIN: ICD-10-CM

## 2024-06-26 DIAGNOSIS — Z00.00 WELLNESS EXAMINATION: ICD-10-CM

## 2024-06-26 DIAGNOSIS — R03.0 ELEVATED BP WITHOUT DIAGNOSIS OF HYPERTENSION: ICD-10-CM

## 2024-06-26 PROCEDURE — 99204 OFFICE O/P NEW MOD 45 MIN: CPT | Mod: S$PBB,,, | Performed by: STUDENT IN AN ORGANIZED HEALTH CARE EDUCATION/TRAINING PROGRAM

## 2024-06-26 PROCEDURE — 99999 PR PBB SHADOW E&M-EST. PATIENT-LVL III: CPT | Mod: PBBFAC,,, | Performed by: STUDENT IN AN ORGANIZED HEALTH CARE EDUCATION/TRAINING PROGRAM

## 2024-06-26 PROCEDURE — 99213 OFFICE O/P EST LOW 20 MIN: CPT | Mod: PBBFAC,PO | Performed by: STUDENT IN AN ORGANIZED HEALTH CARE EDUCATION/TRAINING PROGRAM

## 2024-06-26 RX ORDER — PREDNISONE 20 MG/1
40 TABLET ORAL DAILY
COMMUNITY

## 2024-06-26 RX ORDER — KETOROLAC TROMETHAMINE 10 MG/1
10 TABLET, FILM COATED ORAL EVERY 6 HOURS PRN
COMMUNITY
Start: 2024-06-25

## 2024-06-26 NOTE — PROGRESS NOTES
"     CLINIC NOTE      Mickey Dominguez is a 43 y.o. year old White male with PMH as below. Pt presented to the clinic to establish care.  Problem list and medications reviewed.  He is in [employer] and states that 1 week ago he got viral infection from his gets.  Few days later he started having polyarthralgia.  He had intense pain and stiffness initially in the elbow and then it traveled to his bilateral shoulder, wrist, PIP and metacarpophalangeal joints of the hand as well as knees.  Pain got so severe that he went to the ER yesterday where he was given steroid shot and prednisone 40 mg for 5 days.  States that steroid is helping with the pain and stiffness.  Denies any abnormal rash.     Active Problem List with Overview Notes    Diagnosis Date Noted    CLAUDE on CPAP 07/09/2018    Psychophysiological insomnia 07/09/2018         No problems with medications.  ROS: No fever, chills, cough, chest pain, shortness of breath, nausea, vomiting or diarrhea.    Vitals:    06/26/24 1633   BP: (!) 146/92   BP Location: Right arm   Patient Position: Sitting   BP Method: Large (Manual)   Pulse: 70   SpO2: 98%   Weight: 89.9 kg (198 lb 3.1 oz)   Height: 5' 10.5" (1.791 m)         Body mass index is 28.04 kg/m².   Wt Readings from Last 5 Encounters:   06/26/24 89.9 kg (198 lb 3.1 oz)   08/05/20 85.3 kg (188 lb)   07/29/20 85.3 kg (188 lb)   07/29/20 85.7 kg (188 lb 15 oz)   02/13/19 97.6 kg (215 lb 2.7 oz)       Past Medical History:   Diagnosis Date    Allergy     Anxiety     Arthritis     Closed skull fracture with intracranial hemorrhage, with loss of consciousness     over 24 hour period    Neuromuscular disorder     Obstructive sleep apnea on CPAP     PTSD (post-traumatic stress disorder)        Past Surgical History:   Procedure Laterality Date    FRACTURE SURGERY      left femur    menicus repair      ROTATOR CUFF REPAIR      left       No family history on file.    Social History     Socioeconomic History    Marital status: " "   Tobacco Use    Smoking status: Never    Smokeless tobacco: Never   Substance and Sexual Activity    Alcohol use: No    Drug use: No    Sexual activity: Yes     Partners: Female       Current Outpatient Medications   Medication Sig Dispense Refill    ketorolac (TORADOL) 10 mg tablet Take 10 mg by mouth every 6 (six) hours as needed.      predniSONE (DELTASONE) 20 MG tablet Take 40 mg by mouth once daily. For 5 days       No current facility-administered medications for this visit.       Review of patient's allergies indicates:  No Known Allergies      PHYSICAL EXAM:  General - Well developed, alert and oriented in NAD  HEENT - normocephalic, no evidence of trauma, sclera white, EOMI  Neck - full range of motion  COR - regular rate and rhythm without murmurs or gallops  Lungs - Clear  Abdomen - soft, non-tender  Ext - no cyanosis or edema  Shoulder exam - bilateral ROM restricted due to pain, no pain on motion, no tenderness or deformity noted.      Lab Results   Component Value Date    WBC 8.57 05/28/2018    HGB 16.3 05/28/2018    HCT 46.4 05/28/2018    MCV 81 (L) 05/28/2018    MCH 28.5 05/28/2018    MCHC 35.1 05/28/2018    RDW 12.7 05/28/2018     05/28/2018    MPV 11.4 05/28/2018       Lab Results   Component Value Date     05/28/2018    K 4.1 05/28/2018     05/28/2018    CO2 27 05/28/2018    GLU 95 05/28/2018    BUN 10 05/28/2018    CALCIUM 9.4 05/28/2018    PROT 7.6 05/28/2018    ALBUMIN 4.1 05/28/2018    BILITOT 0.6 05/28/2018    AST 22 05/28/2018    ALKPHOS 58 05/28/2018    ALT 25 05/28/2018       No results found for: "TRIG", "CHOL", "HDL", "LDLCALC", "CHOLHDL", "NONHDLC"      No results found for: "LABA1C", "HGBA1C"    No results found for: "MICROALBUR", "[id number]"          Impression:  1. Viral arthritis  Sedimentation rate    MARIE Screen w/Reflex    RHEUMATOID FACTOR    CYCLIC CITRUL PEPTIDE ANTIBODY, IGG    Comprehensive Metabolic Panel    Hemoglobin A1C    CBC Auto Differential "    Lipid Panel    TSH      2. Left inguinal pain  US Abdomen Complete      3. Elevated BP without diagnosis of hypertension        4. Wellness examination  HIV 1/2 Ag/Ab (4th Gen)    Hepatitis C Antibody           Plan: 1. Viral arthritis  I suspect asymmetric viral arthralgia  Follow up on the labs  Complete steroids and follow up in 2 weeks  Consider referral to Rheumatology if pain persists  -     Sedimentation rate; Future; Expected date: 06/26/2024  -     MARIE Screen w/Reflex; Future; Expected date: 06/26/2024  -     RHEUMATOID FACTOR; Future; Expected date: 06/26/2024  -     CYCLIC CITRUL PEPTIDE ANTIBODY, IGG; Future; Expected date: 06/26/2024  -     Comprehensive Metabolic Panel; Future; Expected date: 06/27/2024  -     Hemoglobin A1C; Future; Expected date: 06/27/2024  -     CBC Auto Differential; Future; Expected date: 06/27/2024  -     Lipid Panel; Future; Expected date: 06/27/2024  -     TSH; Future; Expected date: 06/27/2024    2. Left inguinal pain  Also complaining of on and off left inguinal pain since he did deadlift  Examination benign, follow up on ultrasound  -     US Abdomen Complete; Future; Expected date: 06/26/2024    3. Elevated BP without diagnosis of hypertension  Make blood pressure log for 2 weeks    4. Wellness examination    -     HIV 1/2 Ag/Ab (4th Gen); Future; Expected date: 06/26/2024  -     Hepatitis C Antibody; Future; Expected date: 06/26/2024          Orders Placed This Encounter   Procedures    US Abdomen Complete    Sedimentation rate    MARIE Screen w/Reflex    RHEUMATOID FACTOR    CYCLIC CITRUL PEPTIDE ANTIBODY, IGG    Comprehensive Metabolic Panel    Hemoglobin A1C    CBC Auto Differential    Lipid Panel    TSH    HIV 1/2 Ag/Ab (4th Gen)    Hepatitis C Antibody           No follow-ups on file.     I spent a total of 45 minutes on the day of the visit.This includes face to face time and non-face to face time preparing to see the patient (eg, review of tests), obtaining and/or  reviewing separately obtained history, documenting clinical information in the electronic or other health record, independently interpreting results and communicating results to the patient/family/caregiver, or care coordinator.      This note is generated with speech recognition software and is subject to transcription error and sound alike phrases that may be missed by proofreading      Sabrina Reese MD

## 2024-07-01 ENCOUNTER — HOSPITAL ENCOUNTER (OUTPATIENT)
Dept: RADIOLOGY | Facility: HOSPITAL | Age: 44
Discharge: HOME | End: 2024-07-01
Attending: STUDENT IN AN ORGANIZED HEALTH CARE EDUCATION/TRAINING PROGRAM
Payer: OTHER GOVERNMENT

## 2024-07-01 DIAGNOSIS — R10.32 LEFT INGUINAL PAIN: ICD-10-CM

## 2024-07-01 PROCEDURE — 76882 US LMTD JT/FCL EVL NVASC XTR: CPT | Mod: 26,LT,, | Performed by: STUDENT IN AN ORGANIZED HEALTH CARE EDUCATION/TRAINING PROGRAM

## 2024-07-01 PROCEDURE — 76882 US LMTD JT/FCL EVL NVASC XTR: CPT | Mod: TC,LT

## 2024-07-02 ENCOUNTER — PATIENT MESSAGE (OUTPATIENT)
Dept: FAMILY MEDICINE | Facility: CLINIC | Age: 44
End: 2024-07-02
Payer: OTHER GOVERNMENT

## 2024-07-03 ENCOUNTER — TELEPHONE (OUTPATIENT)
Dept: FAMILY MEDICINE | Facility: CLINIC | Age: 44
End: 2024-07-03
Payer: OTHER GOVERNMENT

## 2024-07-03 NOTE — TELEPHONE ENCOUNTER
----- Message from Sabrina Reese MD sent at 7/3/2024  7:38 AM CDT -----  RF is negative, we can discuss labs in detail in the next appointment. Can make sooner appointment

## 2024-07-15 ENCOUNTER — LAB VISIT (OUTPATIENT)
Dept: LAB | Facility: HOSPITAL | Age: 44
End: 2024-07-15
Attending: STUDENT IN AN ORGANIZED HEALTH CARE EDUCATION/TRAINING PROGRAM
Payer: OTHER GOVERNMENT

## 2024-07-15 ENCOUNTER — OFFICE VISIT (OUTPATIENT)
Dept: FAMILY MEDICINE | Facility: CLINIC | Age: 44
End: 2024-07-15
Payer: OTHER GOVERNMENT

## 2024-07-15 VITALS
OXYGEN SATURATION: 97 % | WEIGHT: 187.19 LBS | DIASTOLIC BLOOD PRESSURE: 78 MMHG | HEIGHT: 71 IN | BODY MASS INDEX: 26.21 KG/M2 | HEART RATE: 76 BPM | TEMPERATURE: 97 F | SYSTOLIC BLOOD PRESSURE: 118 MMHG

## 2024-07-15 DIAGNOSIS — M25.50 ARTHRALGIA, UNSPECIFIED JOINT: Primary | ICD-10-CM

## 2024-07-15 DIAGNOSIS — R59.9 LYMPH NODE ENLARGEMENT: ICD-10-CM

## 2024-07-15 DIAGNOSIS — L98.9 SKIN LESION: ICD-10-CM

## 2024-07-15 PROCEDURE — 36415 COLL VENOUS BLD VENIPUNCTURE: CPT | Mod: PO | Performed by: STUDENT IN AN ORGANIZED HEALTH CARE EDUCATION/TRAINING PROGRAM

## 2024-07-15 PROCEDURE — 99214 OFFICE O/P EST MOD 30 MIN: CPT | Mod: S$PBB,,, | Performed by: STUDENT IN AN ORGANIZED HEALTH CARE EDUCATION/TRAINING PROGRAM

## 2024-07-15 PROCEDURE — 99215 OFFICE O/P EST HI 40 MIN: CPT | Mod: PBBFAC,PO | Performed by: STUDENT IN AN ORGANIZED HEALTH CARE EDUCATION/TRAINING PROGRAM

## 2024-07-15 PROCEDURE — 99999 PR PBB SHADOW E&M-EST. PATIENT-LVL V: CPT | Mod: PBBFAC,,, | Performed by: STUDENT IN AN ORGANIZED HEALTH CARE EDUCATION/TRAINING PROGRAM

## 2024-07-15 PROCEDURE — 86618 LYME DISEASE ANTIBODY: CPT | Performed by: STUDENT IN AN ORGANIZED HEALTH CARE EDUCATION/TRAINING PROGRAM

## 2024-07-15 RX ORDER — FLUCONAZOLE 150 MG/1
150 TABLET ORAL DAILY
Qty: 1 TABLET | Refills: 0 | Status: SHIPPED | OUTPATIENT
Start: 2024-07-15 | End: 2024-07-16

## 2024-07-15 RX ORDER — CLOTRIMAZOLE 1 %
CREAM (GRAM) TOPICAL 2 TIMES DAILY
Qty: 14 G | Refills: 0 | Status: SHIPPED | OUTPATIENT
Start: 2024-07-15 | End: 2024-08-14

## 2024-07-15 NOTE — PROGRESS NOTES
"     CLINIC NOTE      Mickey Dominguez is a 43 y.o. year old White male with PMH as below. Pt presented to the clinic for follow up on arthritis. Still has morning stiffness in joints including hand, wrist, knee, feet and neck. Better from the last time but still it bothers him a lot. Joint pain responded to steroids. MARIE and RF is negative with elevated ESR.    Patient also states that he has concerns for ringworm on the nape of the neck and left thigh.  He had similar lesions years ago where they mentioned that he might have fungal infection and was given oral antifungals and creams.     Ultrasound groin also showed around 4.6 cm lymph node enlargement of the left inguinal ligament.  Denies any fever, chills, night sweats.  Also denies any history of STIs or penile lesions.  No lymph node enlargement elsewhere      Active Problem List with Overview Notes    Diagnosis Date Noted    CLAUDE on CPAP 07/09/2018    Psychophysiological insomnia 07/09/2018         No problems with medications.  ROS: No fever, chills, cough, chest pain, shortness of breath, nausea, vomiting or diarrhea.    Vitals:    07/15/24 0748   BP: 118/78   BP Location: Right arm   Patient Position: Sitting   BP Method: Large (Manual)   Pulse: 76   Temp: 96.8 °F (36 °C)   TempSrc: Tympanic   SpO2: 97%   Weight: 84.9 kg (187 lb 2.7 oz)   Height: 5' 11" (1.803 m)         Body mass index is 26.11 kg/m².   Wt Readings from Last 5 Encounters:   07/15/24 84.9 kg (187 lb 2.7 oz)   06/26/24 89.9 kg (198 lb 3.1 oz)   08/05/20 85.3 kg (188 lb)   07/29/20 85.3 kg (188 lb)   07/29/20 85.7 kg (188 lb 15 oz)       Past Medical History:   Diagnosis Date    Allergy     Anxiety     Arthritis     Closed skull fracture with intracranial hemorrhage, with loss of consciousness     over 24 hour period    Neuromuscular disorder     Obstructive sleep apnea on CPAP     PTSD (post-traumatic stress disorder)        Past Surgical History:   Procedure Laterality Date    FRACTURE " SURGERY      left femur    menicus repair      ROTATOR CUFF REPAIR      left       No family history on file.    Social History     Socioeconomic History    Marital status:    Tobacco Use    Smoking status: Never    Smokeless tobacco: Never   Substance and Sexual Activity    Alcohol use: No    Drug use: No    Sexual activity: Yes     Partners: Female     Social Determinants of Health     Financial Resource Strain: Medium Risk (7/15/2024)    Overall Financial Resource Strain (CARDIA)     Difficulty of Paying Living Expenses: Somewhat hard   Food Insecurity: No Food Insecurity (7/15/2024)    Hunger Vital Sign     Worried About Running Out of Food in the Last Year: Never true     Ran Out of Food in the Last Year: Never true   Physical Activity: Sufficiently Active (7/15/2024)    Exercise Vital Sign     Days of Exercise per Week: 5 days     Minutes of Exercise per Session: 60 min   Stress: Stress Concern Present (7/15/2024)    [place] [employer] - Occupational Stress Questionnaire     Feeling of Stress : To some extent   Housing Stability: Unknown (7/15/2024)    Housing Stability Vital Sign     Unable to Pay for Housing in the Last Year: No       Current Outpatient Medications   Medication Sig Dispense Refill    clotrimazole (LOTRIMIN) 1 % cream Apply topically 2 (two) times daily. 14 g 0    fluconazole (DIFLUCAN) 150 MG Tab Take 1 tablet (150 mg total) by mouth once daily. for 1 day 1 tablet 0    ketorolac (TORADOL) 10 mg tablet Take 10 mg by mouth every 6 (six) hours as needed. (Patient not taking: Reported on 7/15/2024)      predniSONE (DELTASONE) 20 MG tablet Take 40 mg by mouth once daily. For 5 days (Patient not taking: Reported on 7/15/2024)       No current facility-administered medications for this visit.       Review of patient's allergies indicates:  No Known Allergies      PHYSICAL EXAM:  Physical Exam  HENT:      Head: Normocephalic.      Mouth/Throat:      Mouth: Mucous  "membranes are moist.   Eyes:      Extraocular Movements: Extraocular movements intact.      Pupils: Pupils are equal, round, and reactive to light.   Cardiovascular:      Rate and Rhythm: Normal rate and regular rhythm.      Pulses: Normal pulses.      Heart sounds: Normal heart sounds.   Pulmonary:      Effort: Pulmonary effort is normal.      Breath sounds: Normal breath sounds.   Musculoskeletal:         General: Normal range of motion.      Right lower leg: No edema.      Left lower leg: No edema.   Skin:     Capillary Refill: Capillary refill takes less than 2 seconds.      Findings: Rash present.      Comments: Small 0.5 cm circular erythematous lesion present on the left thigh and nape of the neck with some central clearing.     Neurological:      General: No focal deficit present.      Mental Status: He is alert and oriented to person, place, and time.       Lab Results   Component Value Date    WBC 12.76 (H) 07/01/2024    HGB 13.7 (L) 07/01/2024    HCT 41.3 07/01/2024    MCV 86 07/01/2024    MCH 28.5 07/01/2024    MCHC 33.2 07/01/2024    RDW 13.3 07/01/2024     07/01/2024    MPV 10.8 07/01/2024       Lab Results   Component Value Date     07/01/2024    K 3.9 07/01/2024     07/01/2024    CO2 23 07/01/2024    GLU 75 07/01/2024    BUN 11 07/01/2024    CALCIUM 8.5 (L) 07/01/2024    PROT 6.8 07/01/2024    ALBUMIN 3.4 (L) 07/01/2024    BILITOT 0.4 07/01/2024    AST 21 07/01/2024    ALKPHOS 50 (L) 07/01/2024    ALT 30 07/01/2024       Lab Results   Component Value Date    TRIG 147 07/01/2024    CHOL 156 07/01/2024    HDL 32 (L) 07/01/2024    LDLCALC 94.6 07/01/2024    CHOLHDL 20.5 07/01/2024           Lab Results   Component Value Date    HGBA1C 5.3 07/01/2024       No results found for: "MICROALBUR", "[id number]"          Impression:  1. Arthralgia, unspecified joint  Ambulatory referral/consult to Rheumatology      2. Skin lesion  LYME DISEASE ANTIBODY BY EIA    clotrimazole (LOTRIMIN) 1 % " cream    fluconazole (DIFLUCAN) 150 MG Tab      3. Lymph node enlargement  Ambulatory referral/consult to General Surgery           Plan: 1. Arthralgia, unspecified joint  There is concern for viral arthritis/autoimmune pathology  Making referral to Rheumatology  -     Ambulatory referral/consult to Rheumatology; Future; Expected date: 07/22/2024    2. Skin lesion  Patient has skin lesions  Differential diagnosis include ringworm  But due to viral arthritis and multiple round skin lesions with some central clearing, there is some concern for Lyme disease as patient is in [employer] and has been to tick infested areas  -     LYME DISEASE ANTIBODY BY EIA; Future; Expected date: 07/15/2024  -     clotrimazole (LOTRIMIN) 1 % cream; Apply topically 2 (two) times daily.  Dispense: 14 g; Refill: 0  -     fluconazole (DIFLUCAN) 150 MG Tab; Take 1 tablet (150 mg total) by mouth once daily. for 1 day  Dispense: 1 tablet; Refill: 0    3. Lymph node enlargement  4.6 cm lymph node enlargement in the left inguinal ligament  No lymph nodes palpated on examination elsewhere  Referral placed to general surgery for possible biopsy  -     Ambulatory referral/consult to General Surgery; Future; Expected date: 07/22/2024          Orders Placed This Encounter   Procedures    LYME DISEASE ANTIBODY BY EIA    Ambulatory referral/consult to Rheumatology    Ambulatory referral/consult to General Surgery           No follow-ups on file.     I spent a total of 32 minutes on the day of the visit.This includes face to face time and non-face to face time preparing to see the patient (eg, review of tests), obtaining and/or reviewing separately obtained history, documenting clinical information in the electronic or other health record, independently interpreting results and communicating results to the patient/family/caregiver, or care coordinator.      This note is generated with speech recognition software and is subject to transcription error and sound  alike phrases that may be missed by proofreading      Sabrina Reese MD

## 2024-07-18 LAB — B BURGDOR AB SER IA-ACNC: 0.23 INDEX VALUE

## 2024-09-12 ENCOUNTER — PATIENT MESSAGE (OUTPATIENT)
Dept: FAMILY MEDICINE | Facility: CLINIC | Age: 44
End: 2024-09-12
Payer: OTHER GOVERNMENT

## 2024-09-26 ENCOUNTER — OFFICE VISIT (OUTPATIENT)
Dept: SURGERY | Facility: CLINIC | Age: 44
End: 2024-09-26
Payer: OTHER GOVERNMENT

## 2024-09-26 VITALS
HEART RATE: 70 BPM | HEIGHT: 71 IN | WEIGHT: 186.5 LBS | DIASTOLIC BLOOD PRESSURE: 89 MMHG | BODY MASS INDEX: 26.11 KG/M2 | SYSTOLIC BLOOD PRESSURE: 137 MMHG

## 2024-09-26 DIAGNOSIS — R59.9 LYMPH NODE ENLARGEMENT: Primary | ICD-10-CM

## 2024-09-26 PROCEDURE — 99204 OFFICE O/P NEW MOD 45 MIN: CPT | Mod: S$PBB,,,

## 2024-09-26 PROCEDURE — 99999 PR PBB SHADOW E&M-EST. PATIENT-LVL III: CPT | Mod: PBBFAC,,,

## 2024-09-26 PROCEDURE — 99213 OFFICE O/P EST LOW 20 MIN: CPT | Mod: PBBFAC

## 2024-09-26 NOTE — PROGRESS NOTES
History & Physical    Subjective     History of Present Illness:  Patient is a 43 y.o. male who presents for evaluation of left inguinal lymphadenopathy noted on recent U/S. He is in the  and was participating in a fitness test last December when he felt like he injured his left groin. He initially felt like this was a muscle strain, but the pain persisted in the area for several months.  Since then, he has had onset of significant joint pain in several joints throughout his body associated with swelling of his extremities and a discoid rash at times.  He is undergoing rheumatologic/autoimmune workup with a his PCP and has been referred to Rheumatology.  He underwent ultrasound of his left groin in July without evidence of hernia but with some prominent lymph nodes as below. Denies any family or personal history of cancer. Denies any unexpected weight loss, fevers, chills, night sweats, nausea, or vomiting. Denies any recent infections in the groin area, including STIs.    Chief Complaint   Patient presents with    Lymphadenopathy       Review of patient's allergies indicates:  No Known Allergies    Current Outpatient Medications   Medication Sig Dispense Refill    clotrimazole (LOTRIMIN) 1 % cream Apply topically 2 (two) times daily. 14 g 0    ketorolac (TORADOL) 10 mg tablet Take 10 mg by mouth every 6 (six) hours as needed. (Patient not taking: Reported on 7/15/2024)      predniSONE (DELTASONE) 20 MG tablet Take 40 mg by mouth once daily. For 5 days (Patient not taking: Reported on 7/15/2024)       No current facility-administered medications for this visit.       Past Medical History:   Diagnosis Date    Allergy     Anxiety     Arthritis     Closed skull fracture with intracranial hemorrhage, with loss of consciousness     over 24 hour period    Neuromuscular disorder     Obstructive sleep apnea on CPAP     PTSD (post-traumatic stress disorder)      Past Surgical History:   Procedure Laterality Date     "FRACTURE SURGERY      left femur    menicus repair      ROTATOR CUFF REPAIR      left     No family history on file.  Social History     Tobacco Use    Smoking status: Never    Smokeless tobacco: Never   Substance Use Topics    Alcohol use: No    Drug use: No        Review of Systems:  Review of Systems   Constitutional:  Negative for chills, fatigue, fever and unexpected weight change.   HENT:  Negative for congestion.    Eyes:  Negative for visual disturbance.   Respiratory:  Negative for shortness of breath.    Cardiovascular:  Negative for chest pain.   Gastrointestinal:  Negative for abdominal distention, abdominal pain, constipation, nausea, rectal pain and vomiting.   Genitourinary:  Negative for dysuria.   Musculoskeletal:  Positive for arthralgias and joint swelling.   Skin:  Negative for rash.   Neurological:  Negative for light-headedness.   Hematological:  Negative for adenopathy. Does not bruise/bleed easily.          Objective     Vital Signs (Most Recent)  Pulse: 70 (09/26/24 0855)  BP: 137/89 (09/26/24 0855)  5' 11" (1.803 m)  84.6 kg (186 lb 8.2 oz)     Physical Exam:  Physical Exam  Vitals reviewed.   Constitutional:       General: He is not in acute distress.     Appearance: He is well-developed. He is not toxic-appearing.   HENT:      Head: Normocephalic and atraumatic.      Right Ear: External ear normal.      Left Ear: External ear normal.      Nose: Nose normal.      Mouth/Throat:      Mouth: Mucous membranes are moist.   Eyes:      Extraocular Movements: Extraocular movements intact.      Conjunctiva/sclera: Conjunctivae normal.   Cardiovascular:      Rate and Rhythm: Normal rate.   Pulmonary:      Effort: Pulmonary effort is normal. No respiratory distress.   Abdominal:      Comments: No palpable inguinal hernia bilaterally, at rest and on valsalva   Musculoskeletal:      Cervical back: Normal range of motion and neck supple.   Lymphadenopathy:      Head:      Right side of head: No " submental, submandibular, tonsillar, preauricular, posterior auricular or occipital adenopathy.      Left side of head: No submental, submandibular, tonsillar, preauricular, posterior auricular or occipital adenopathy.      Cervical: No cervical adenopathy.      Right cervical: No superficial, deep or posterior cervical adenopathy.     Left cervical: No superficial, deep or posterior cervical adenopathy.      Upper Body:      Right upper body: No supraclavicular, axillary, pectoral or epitrochlear adenopathy.      Left upper body: No supraclavicular, axillary, pectoral or epitrochlear adenopathy.      Lower Body: No right inguinal adenopathy. No left inguinal adenopathy.      Comments: No palpable lymphadenopathy on examination   Skin:     General: Skin is warm and dry.   Neurological:      Mental Status: He is alert and oriented to person, place, and time.   Psychiatric:         Behavior: Behavior normal.           Diagnostic Results:  U/S groin left   FINDINGS:  At site of patient's concern, 2 inguinal lymph nodes are seen with normal fatty asa and no pathologic akilah enlargement.  Nodes measure 2.1 x 0.7 x 1.7 cm and 4.6 x 0.5 x 0.7 cm.  No concerning mass, fluid collection, hernia, or other significant sonographic abnormality seen.     Assessment and Plan   44 y/o male with U/S with lymph nodes noted as above.     - Discussed possible causes of lymphadenopathy including non-specific inflammation, infection, lymphoma, etc. Discussed that final diagnosis cannot be made without pathologic examination.  - Discussed that while lymph nodes were noted, they are not overtly concerning or pathologically enlarged, which is reassuring.  - Discussed options including interval imaging in 3 months, FNA, or excisional biopsy. Patient elected for surveillance, which is reasonable at this time.  - Discussed that if there is further lymph node enlargement on f/u imaging, we will need to pursue a biopsy at that time.   - RTC 3  months after imaging for re-evaluation.      Zahra Juarez PA-C  [facility]      I spent a total of 45 minutes on the day of the visit.This includes face to face time and non-face to face time preparing to see the patient (eg, review of tests), obtaining and/or reviewing separately obtained history, documenting clinical information in the electronic or other health record, independently interpreting results and communicating results to the patient/family/caregiver, or care coordinator.

## 2024-11-21 ENCOUNTER — HOSPITAL ENCOUNTER (OUTPATIENT)
Dept: RADIOLOGY | Facility: HOSPITAL | Age: 44
Discharge: HOME | End: 2024-11-21
Attending: STUDENT IN AN ORGANIZED HEALTH CARE EDUCATION/TRAINING PROGRAM
Payer: OTHER GOVERNMENT

## 2024-11-21 ENCOUNTER — OFFICE VISIT (OUTPATIENT)
Dept: RHEUMATOLOGY | Facility: CLINIC | Age: 44
End: 2024-11-21
Payer: OTHER GOVERNMENT

## 2024-11-21 VITALS
HEIGHT: 71 IN | DIASTOLIC BLOOD PRESSURE: 79 MMHG | WEIGHT: 203.06 LBS | SYSTOLIC BLOOD PRESSURE: 119 MMHG | BODY MASS INDEX: 28.43 KG/M2

## 2024-11-21 DIAGNOSIS — M19.90 INFLAMMATORY ARTHRITIS: ICD-10-CM

## 2024-11-21 DIAGNOSIS — R70.0 ELEVATED SED RATE: ICD-10-CM

## 2024-11-21 DIAGNOSIS — M19.90 INFLAMMATORY ARTHRITIS: Primary | ICD-10-CM

## 2024-11-21 DIAGNOSIS — M25.50 ARTHRALGIA, UNSPECIFIED JOINT: ICD-10-CM

## 2024-11-21 PROCEDURE — 77077 JOINT SURVEY SINGLE VIEW: CPT | Mod: TC

## 2024-11-21 PROCEDURE — 77077 JOINT SURVEY SINGLE VIEW: CPT | Mod: 26,,, | Performed by: RADIOLOGY

## 2024-11-21 PROCEDURE — 99999 PR PBB SHADOW E&M-EST. PATIENT-LVL IV: CPT | Mod: PBBFAC,,, | Performed by: STUDENT IN AN ORGANIZED HEALTH CARE EDUCATION/TRAINING PROGRAM

## 2024-11-21 PROCEDURE — 72100 X-RAY EXAM L-S SPINE 2/3 VWS: CPT | Mod: 26,,, | Performed by: RADIOLOGY

## 2024-11-21 PROCEDURE — 99205 OFFICE O/P NEW HI 60 MIN: CPT | Mod: S$PBB,,, | Performed by: STUDENT IN AN ORGANIZED HEALTH CARE EDUCATION/TRAINING PROGRAM

## 2024-11-21 PROCEDURE — 99214 OFFICE O/P EST MOD 30 MIN: CPT | Mod: PBBFAC | Performed by: STUDENT IN AN ORGANIZED HEALTH CARE EDUCATION/TRAINING PROGRAM

## 2024-11-21 PROCEDURE — 72100 X-RAY EXAM L-S SPINE 2/3 VWS: CPT | Mod: TC

## 2024-11-21 PROCEDURE — 72202 X-RAY EXAM SI JOINTS 3/> VWS: CPT | Mod: 26,,, | Performed by: RADIOLOGY

## 2024-11-21 PROCEDURE — 72202 X-RAY EXAM SI JOINTS 3/> VWS: CPT | Mod: TC

## 2024-11-21 PROCEDURE — 71046 X-RAY EXAM CHEST 2 VIEWS: CPT | Mod: 26,,, | Performed by: RADIOLOGY

## 2024-11-21 PROCEDURE — 71046 X-RAY EXAM CHEST 2 VIEWS: CPT | Mod: TC

## 2024-11-21 NOTE — PROGRESS NOTES
Subjective:      Patient ID: Mickey Dominguez is a 43 y.o. male.    Chief Complaint: arthritis    HPI:   Patient presents for Rheumatology evaluation. For the past 10 years he's been getting flare ups of joint pain with swelling. Initially affecting low back, feet, toes, ankles with erythema and swelling in the distal joints and soft tissue, sometimes will tiny pustules over the heels. He showed me a photo from his phone of swollen and red toes during a flare up. Then flares started involving elbows, wrists, MCPs, PIPs. He feels widespread pain during flares, like he's 89 years old he says. Gets blurry vision during flares. Flare ups might happen twice a year or 8 times a year. Flare ups used to last a few days. However a few months ago he had a flare lasting 10 weeks. Denies fever during flare ups. Prednisone seems to help. Between flare ups he feels great. 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 He's had exposure to harmful chemicals and infections. 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 He reports MRI in the  showed spondylosis in the spine. Various infection tests have been negative so far. RF, CCP, MARIE are negative. ESR was elevated recently even after a course of prednisone for a recent flare.    He does not have any family history of autoimmune disease or seronegative spondyloarthritis. He reports having iron deficiency anemia intermittently. He has never had a colonoscopy. He gets epistaxis sometimes due to getting hit in the nose when playing sports. He denies any other obvious sources of bleeding or melena.    Sometimes gets dry mouth with dysphagia.     Denies joint swelling, significant stiffness, skin rashes, psoriasis, oral ulcers, patchy alopecia, sicca symptoms, cardiopulmonary symptoms, eye inflammation, IBD, fevers, weight loss, Raynaud's. Rheumatology review of systems is otherwise  "negative.    Objective:   /79 (BP Location: Right arm, Patient Position: Sitting)   Ht 5' 11" (1.803 m)   Wt 92.1 kg (203 lb 0.7 oz)   BMI 28.32 kg/m²   Physical Exam  Constitutional:       General: He is not in acute distress.     Appearance: Normal appearance.   HENT:      Head: Normocephalic and atraumatic.      Mouth/Throat:      Mouth: Mucous membranes are moist.      Pharynx: Oropharynx is clear.   Cardiovascular:      Rate and Rhythm: Normal rate and regular rhythm.   Pulmonary:      Effort: Pulmonary effort is normal.      Breath sounds: Normal breath sounds.   Abdominal:      Palpations: Abdomen is soft.      Tenderness: There is no abdominal tenderness.   Musculoskeletal:         General: No swelling or tenderness.      Cervical back: Normal range of motion. No tenderness.      Comments: Spine ROM is intact.   Skin:     General: Skin is warm and dry.   Neurological:      Mental Status: He is alert and oriented to person, place, and time. Mental status is at baseline.           Photo from his phone during flare:        Assessment and Plan:     Problem List Items Addressed This Visit          Unprioritized    Arthralgia     Other Visit Diagnoses       Inflammatory arthritis    -  Primary    Relevant Orders    Uric Acid    Sjogrens syndrome-A extractable nuclear antibody    X-Ray Lumbar Spine Ap And Lateral    X-Ray Sacroiliac Joints Complete    HLA B27 Antigen    CBC Auto Differential    Comprehensive Metabolic Panel    C-Reactive Protein    Sedimentation rate    Hepatitis B surface antigen    HBcAB    Hepatitis B surface antibody    Strongyloides IgG Antibodies    Quantiferon Gold TB    Treponema Pallidium Antibodies IgG, IgM    XR Arthritis Survey    X-Ray Chest PA And Lateral    C. trachomatis/N. gonorrhoeae by AMP DNA [facility]; Urine    Ferritin    Iron and TIBC    Immunofixation Electrophoresis    Immunoglobulin Free LT Chains Blood    Protein Electrophoresis, Serum    Elevated sed rate        " Relevant Orders    Uric Acid    Sjogrens syndrome-A extractable nuclear antibody    X-Ray Lumbar Spine Ap And Lateral    X-Ray Sacroiliac Joints Complete    HLA B27 Antigen    CBC Auto Differential    Comprehensive Metabolic Panel    C-Reactive Protein    Sedimentation rate    Hepatitis B surface antigen    HBcAB    Hepatitis B surface antibody    Strongyloides IgG Antibodies    Quantiferon Gold TB    Treponema Pallidium Antibodies IgG, IgM    XR Arthritis Survey    X-Ray Chest PA And Lateral    C. trachomatis/N. gonorrhoeae by AMP DNA [facility]; Urine    Ferritin    Iron and TIBC    Immunofixation Electrophoresis    Immunoglobulin Free LT Chains Blood    Protein Electrophoresis, Serum            Patient presents for Rheumatology evaluation.    Elevated ESR.  Normocytic anemia.  Negative MARIE, RF, CCP.    Symptoms of intermittent widespread arthralgias and generalized joint or soft tissue swelling. Also with degenerative arthritis in the spine per his report. Differential for the flare ups includes connective tissue disease, inflammatory arthritis, seronegative spondyloarthritis, reactive arthritis, infectious arthritis, among others.    Plan:  Labs and x-rays today.  Follow up in 3-4 weeks to go over results.  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 If all infection workup is negative, consider treating empirically for seronegative RA or SpA.        I spent a total of 60 minutes on the day of the visit.  This includes face to face time and non-face to face time preparing to see the patient (eg, review of tests), obtaining and/or reviewing separately obtained history, documenting clinical information in the electronic or other health record, independently interpreting results and communicating results to the patient/family/caregiver, or care coordinator.

## 2024-12-11 ENCOUNTER — OFFICE VISIT (OUTPATIENT)
Dept: RHEUMATOLOGY | Facility: CLINIC | Age: 44
End: 2024-12-11
Payer: OTHER GOVERNMENT

## 2024-12-11 ENCOUNTER — HOSPITAL ENCOUNTER (OUTPATIENT)
Dept: RADIOLOGY | Facility: HOSPITAL | Age: 44
Discharge: HOME | End: 2024-12-11
Payer: OTHER GOVERNMENT

## 2024-12-11 VITALS
HEIGHT: 71 IN | WEIGHT: 202.19 LBS | DIASTOLIC BLOOD PRESSURE: 74 MMHG | BODY MASS INDEX: 28.31 KG/M2 | HEART RATE: 77 BPM | SYSTOLIC BLOOD PRESSURE: 126 MMHG

## 2024-12-11 DIAGNOSIS — R76.12 POSITIVE QUANTIFERON-TB GOLD TEST: ICD-10-CM

## 2024-12-11 DIAGNOSIS — M25.50 ARTHRALGIA, UNSPECIFIED JOINT: Primary | ICD-10-CM

## 2024-12-11 DIAGNOSIS — R59.9 LYMPH NODE ENLARGEMENT: ICD-10-CM

## 2024-12-11 DIAGNOSIS — R70.0 ELEVATED SED RATE: ICD-10-CM

## 2024-12-11 DIAGNOSIS — R13.10 DYSPHAGIA, UNSPECIFIED TYPE: ICD-10-CM

## 2024-12-11 PROCEDURE — 76882 US LMTD JT/FCL EVL NVASC XTR: CPT | Mod: 26,LT,, | Performed by: RADIOLOGY

## 2024-12-11 PROCEDURE — 99214 OFFICE O/P EST MOD 30 MIN: CPT | Mod: S$PBB,,, | Performed by: STUDENT IN AN ORGANIZED HEALTH CARE EDUCATION/TRAINING PROGRAM

## 2024-12-11 PROCEDURE — 99214 OFFICE O/P EST MOD 30 MIN: CPT | Mod: PBBFAC,25 | Performed by: STUDENT IN AN ORGANIZED HEALTH CARE EDUCATION/TRAINING PROGRAM

## 2024-12-11 PROCEDURE — 76882 US LMTD JT/FCL EVL NVASC XTR: CPT | Mod: TC,LT

## 2024-12-11 PROCEDURE — 99999 PR PBB SHADOW E&M-EST. PATIENT-LVL IV: CPT | Mod: PBBFAC,,, | Performed by: STUDENT IN AN ORGANIZED HEALTH CARE EDUCATION/TRAINING PROGRAM

## 2024-12-11 RX ORDER — NAPROXEN 500 MG/1
500 TABLET ORAL 2 TIMES DAILY
Qty: 30 TABLET | Refills: 1 | Status: SHIPPED | OUTPATIENT
Start: 2024-12-11

## 2024-12-11 RX ORDER — METHYLPREDNISOLONE 4 MG/1
TABLET ORAL
Qty: 21 EACH | Refills: 3 | Status: SHIPPED | OUTPATIENT
Start: 2024-12-11 | End: 2025-01-01

## 2024-12-11 NOTE — PROGRESS NOTES
Subjective:      Patient ID: Mickey Dominguez is a 43 y.o. male.    Chief Complaint: arthritis    HPI:   Patient presents for Rheumatology evaluation. Presents to go over results. No flares since last visit. Rheumatology ROS as below and otherwise negative.      Initial Hx:  For the past 10 years he's been getting flare ups of joint pain with swelling. Initially affecting low back, feet, toes, ankles with erythema and swelling in the distal joints and soft tissue, sometimes will tiny pustules over the heels. He showed me a photo from his phone of swollen and red toes during a flare up. Then flares started involving elbows, wrists, MCPs, PIPs. He feels widespread pain during flares, like he's 89 years old he says. Gets blurry vision during flares. Flare ups might happen twice a year or 8 times a year. Flare ups used to last a few days. However a few months ago he had a flare lasting 10 weeks. Denies fever during flare ups. Prednisone seems to help. Between flare ups he feels great. 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 He's had exposure to harmful chemicals and infections. 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 He reports MRI in the  showed spondylosis in the spine. Various infection tests have been negative so far. RF, CCP, MARIE are negative. ESR was elevated recently even after a course of prednisone for a recent flare.    He does not have any family history of autoimmune disease or seronegative spondyloarthritis. He reports having iron deficiency anemia intermittently. He has never had a colonoscopy. He gets epistaxis sometimes due to getting hit in the nose when playing sports. He denies any other obvious sources of bleeding or melena.    Sometimes gets dry mouth with dysphagia.     Denies joint swelling, significant stiffness, skin rashes, psoriasis, oral ulcers, patchy alopecia, sicca symptoms,  "cardiopulmonary symptoms, eye inflammation, IBD, fevers, weight loss, Raynaud's. Rheumatology review of systems is otherwise negative.    Objective:   /74 (BP Location: Right arm, Patient Position: Sitting)   Pulse 77   Ht 5' 11" (1.803 m)   Wt 91.7 kg (202 lb 2.6 oz)   BMI 28.20 kg/m²   Physical Exam  Constitutional:       General: He is not in acute distress.     Appearance: Normal appearance.   HENT:      Head: Normocephalic and atraumatic.      Mouth/Throat:      Mouth: Mucous membranes are moist.      Pharynx: Oropharynx is clear.   Cardiovascular:      Rate and Rhythm: Normal rate and regular rhythm.   Pulmonary:      Effort: Pulmonary effort is normal.      Breath sounds: Normal breath sounds.   Abdominal:      Palpations: Abdomen is soft.      Tenderness: There is no abdominal tenderness.   Musculoskeletal:         General: No swelling or tenderness.      Cervical back: Normal range of motion. No tenderness.   Skin:     General: Skin is warm and dry.   Neurological:      Mental Status: He is alert and oriented to person, place, and time. Mental status is at baseline.           Photo from his phone during flare:        Assessment and Plan:     Problem List Items Addressed This Visit          Unprioritized    Arthralgia - Primary    Relevant Orders    Anti-scleroderma antibody    RNA polymerase III Ab, IgG    PM-Scl Antibody by Immunodiffusion    Anti Sm/RNP Antibody    Th/To Antibody    MyoMarker Panel 3    Anti-Neutrophilic Cytoplasmic Antibody    Proteinase 3 Autoantibodies    Myeloperoxidase Antibody (MPO)    Cryoglobulin    Periodic Fever Syndromes Panel    Lymph node enlargement    Relevant Orders    Anti-scleroderma antibody    RNA polymerase III Ab, IgG    PM-Scl Antibody by Immunodiffusion    Anti Sm/RNP Antibody    Th/To Antibody    MyoMarker Panel 3    Anti-Neutrophilic Cytoplasmic Antibody    Proteinase 3 Autoantibodies    Myeloperoxidase Antibody (MPO)    Cryoglobulin    Ambulatory " referral/consult to Infectious Disease     Other Visit Diagnoses       Elevated sed rate        Relevant Orders    Anti-scleroderma antibody    RNA polymerase III Ab, IgG    PM-Scl Antibody by Immunodiffusion    Anti Sm/RNP Antibody    Th/To Antibody    MyoMarker Panel 3    Anti-Neutrophilic Cytoplasmic Antibody    Proteinase 3 Autoantibodies    Myeloperoxidase Antibody (MPO)    Cryoglobulin    Periodic Fever Syndromes Panel    Positive QuantiFERON-TB Gold test        Relevant Orders    Anti-scleroderma antibody    RNA polymerase III Ab, IgG    PM-Scl Antibody by Immunodiffusion    Anti Sm/RNP Antibody    Th/To Antibody    MyoMarker Panel 3    Anti-Neutrophilic Cytoplasmic Antibody    Proteinase 3 Autoantibodies    Myeloperoxidase Antibody (MPO)    Cryoglobulin    Ambulatory referral/consult to Infectious Disease    Dysphagia, unspecified type        Relevant Orders    Anti-scleroderma antibody    RNA polymerase III Ab, IgG    PM-Scl Antibody by Immunodiffusion    Anti Sm/RNP Antibody    Th/To Antibody    MyoMarker Panel 3    Anti-Neutrophilic Cytoplasmic Antibody    Proteinase 3 Autoantibodies    Myeloperoxidase Antibody (MPO)    Cryoglobulin            Patient presents for Rheumatology evaluation.    Elevated ESR.  Normocytic anemia.  Negative MARIE, RF, CCP.    Symptoms of intermittent widespread arthralgias and generalized joint or soft tissue swelling. Differential for the flare ups includes connective tissue disease, inflammatory arthritis, seronegative spondyloarthritis, reactive arthritis, infectious arthritis, among others. We checked labs here and inflammatory markers returned to normal (not flaring at the time). Quantiferon is positive. Rheumatology initial workup labs are unrevealing so will do further lab testing today. XR negative except for lumbar degenerative disease. CXR normal. He reports having negative PPD 2 years ago. Has been deployed in the past 2 years.     Plan:  Labs for scleroderma,  myositis, vasculitis.  Referral to ID for positive Quantiferon and for any possible underlying infection which might cause his symptoms.  If TB is treated and any active infection causing the symptoms is ruled out, we can consider treating empirically for seronegative RA or SpA.        I spent a total of 30 minutes on the day of the visit.  This includes face to face time and non-face to face time preparing to see the patient (eg, review of tests), obtaining and/or reviewing separately obtained history, documenting clinical information in the electronic or other health record, independently interpreting results and communicating results to the patient/family/caregiver, or care coordinator.        Follow up in about 6 months (around 6/11/2025).

## 2024-12-12 ENCOUNTER — TELEPHONE (OUTPATIENT)
Dept: SURGERY | Facility: CLINIC | Age: 44
End: 2024-12-12
Payer: OTHER GOVERNMENT

## 2024-12-12 NOTE — TELEPHONE ENCOUNTER
Called patient to discuss results of U/S showing stable lymph nodes and now with small fat containing inguinal hernia. He states he was recently diagnosed with TB by his rheumatologist, and she is requesting a lymph node biopsy at this time. Discussed possibility of lymph node biopsy in conjunction with hernia repair. He is scheduled to see me Monday, and we will further discuss.      Zahra Juarez PA-C  [facility]     pt and his wife at bedside

## 2024-12-16 ENCOUNTER — OFFICE VISIT (OUTPATIENT)
Dept: SURGERY | Facility: CLINIC | Age: 44
End: 2024-12-16
Payer: OTHER GOVERNMENT

## 2024-12-16 ENCOUNTER — LAB VISIT (OUTPATIENT)
Dept: LAB | Facility: HOSPITAL | Age: 44
End: 2024-12-16
Payer: OTHER GOVERNMENT

## 2024-12-16 VITALS
BODY MASS INDEX: 27.8 KG/M2 | SYSTOLIC BLOOD PRESSURE: 122 MMHG | DIASTOLIC BLOOD PRESSURE: 86 MMHG | WEIGHT: 199.31 LBS | HEART RATE: 72 BPM

## 2024-12-16 DIAGNOSIS — R59.9 LYMPH NODE ENLARGEMENT: ICD-10-CM

## 2024-12-16 DIAGNOSIS — K40.90 LEFT INGUINAL HERNIA: ICD-10-CM

## 2024-12-16 DIAGNOSIS — R59.9 LYMPH NODE ENLARGEMENT: Primary | ICD-10-CM

## 2024-12-16 LAB
ALBUMIN SERPL BCP-MCNC: 3.9 G/DL (ref 3.5–5.2)
ALP SERPL-CCNC: 52 U/L (ref 40–150)
ALT SERPL W/O P-5'-P-CCNC: 28 U/L (ref 10–44)
ANION GAP SERPL CALC-SCNC: 4 MMOL/L (ref 8–16)
AST SERPL-CCNC: 26 U/L (ref 10–40)
BASOPHILS # BLD AUTO: 0.05 K/UL (ref 0–0.2)
BASOPHILS NFR BLD: 0.6 % (ref 0–1.9)
BILIRUB SERPL-MCNC: 0.4 MG/DL (ref 0.1–1)
BUN SERPL-MCNC: 16 MG/DL (ref 6–20)
CALCIUM SERPL-MCNC: 9.3 MG/DL (ref 8.7–10.5)
CHLORIDE SERPL-SCNC: 107 MMOL/L (ref 95–110)
CO2 SERPL-SCNC: 28 MMOL/L (ref 23–29)
CREAT SERPL-MCNC: 0.9 MG/DL (ref 0.5–1.4)
DIFFERENTIAL METHOD BLD: ABNORMAL
EOSINOPHIL # BLD AUTO: 0.1 K/UL (ref 0–0.5)
EOSINOPHIL NFR BLD: 1.6 % (ref 0–8)
ERYTHROCYTE [DISTWIDTH] IN BLOOD BY AUTOMATED COUNT: 13.1 % (ref 11.5–14.5)
EST. GFR  (NO RACE VARIABLE): >60 ML/MIN/1.73 M^2
GLUCOSE SERPL-MCNC: 99 MG/DL (ref 70–110)
HCT VFR BLD AUTO: 48.2 % (ref 40–54)
HGB BLD-MCNC: 15.9 G/DL (ref 14–18)
IMM GRANULOCYTES # BLD AUTO: 0.09 K/UL (ref 0–0.04)
IMM GRANULOCYTES NFR BLD AUTO: 1.1 % (ref 0–0.5)
LYMPHOCYTES # BLD AUTO: 2.4 K/UL (ref 1–4.8)
LYMPHOCYTES NFR BLD: 30.4 % (ref 18–48)
MCH RBC QN AUTO: 27.8 PG (ref 27–31)
MCHC RBC AUTO-ENTMCNC: 33 G/DL (ref 32–36)
MCV RBC AUTO: 84 FL (ref 82–98)
MONOCYTES # BLD AUTO: 0.5 K/UL (ref 0.3–1)
MONOCYTES NFR BLD: 6.1 % (ref 4–15)
NEUTROPHILS # BLD AUTO: 4.8 K/UL (ref 1.8–7.7)
NEUTROPHILS NFR BLD: 60.2 % (ref 38–73)
NRBC BLD-RTO: 0 /100 WBC
PLATELET # BLD AUTO: 187 K/UL (ref 150–450)
PMV BLD AUTO: 11.4 FL (ref 9.2–12.9)
POTASSIUM SERPL-SCNC: 4.7 MMOL/L (ref 3.5–5.1)
PROT SERPL-MCNC: 7.2 G/DL (ref 6–8.4)
RBC # BLD AUTO: 5.72 M/UL (ref 4.6–6.2)
SODIUM SERPL-SCNC: 139 MMOL/L (ref 136–145)
WBC # BLD AUTO: 7.99 K/UL (ref 3.9–12.7)

## 2024-12-16 PROCEDURE — 99215 OFFICE O/P EST HI 40 MIN: CPT | Mod: S$PBB,,,

## 2024-12-16 PROCEDURE — 85025 COMPLETE CBC W/AUTO DIFF WBC: CPT

## 2024-12-16 PROCEDURE — 99999 PR PBB SHADOW E&M-EST. PATIENT-LVL IV: CPT | Mod: PBBFAC,,,

## 2024-12-16 PROCEDURE — 99214 OFFICE O/P EST MOD 30 MIN: CPT | Mod: PBBFAC

## 2024-12-16 PROCEDURE — 36415 COLL VENOUS BLD VENIPUNCTURE: CPT

## 2024-12-16 PROCEDURE — 80053 COMPREHEN METABOLIC PANEL: CPT

## 2024-12-16 RX ORDER — SODIUM CHLORIDE, SODIUM LACTATE, POTASSIUM CHLORIDE, CALCIUM CHLORIDE 600; 310; 30; 20 MG/100ML; MG/100ML; MG/100ML; MG/100ML
INJECTION, SOLUTION INTRAVENOUS CONTINUOUS
OUTPATIENT
Start: 2024-12-16

## 2024-12-16 RX ORDER — CEFAZOLIN SODIUM 2 G/50ML
2 SOLUTION INTRAVENOUS
OUTPATIENT
Start: 2024-12-16

## 2024-12-16 RX ORDER — ONDANSETRON HYDROCHLORIDE 2 MG/ML
4 INJECTION, SOLUTION INTRAVENOUS EVERY 12 HOURS PRN
OUTPATIENT
Start: 2024-12-16

## 2024-12-16 NOTE — PROGRESS NOTES
History & Physical    Subjective     History of Present Illness:  Patient is a 44 y.o. male who presents for evaluation of left inguinal lymphadenopathy noted on recent U/S. He is in the  and was participating in a fitness test last December when he felt like he injured his left groin. He initially felt like this was a muscle strain, but the pain persisted in the area for several months.  Since then, he has had onset of significant joint pain in several joints throughout his body associated with swelling of his extremities and a discoid rash at times.  He is undergoing rheumatologic/autoimmune workup with a his PCP and has been referred to Rheumatology.  He underwent ultrasound of his left groin in July without evidence of hernia but with some prominent lymph nodes as below. Denies any family or personal history of cancer. Denies any unexpected weight loss, fevers, chills, night sweats, nausea, or vomiting. Denies any recent infections in the groin area, including STIs.    Interval History:  Since last clinic visit, the patient underwent interval imaging of his left inguinal lymph nodes.  An inguinal hernia was discovered, the patient is still having groin pain.  The lymph nodes in the groin were benign-appearing, but he states that his rheumatologist is requesting a biopsy of the lymph nodes in the area.  He is tolerating a regular diet and having normal bowel movements.  He is urinating without issue.  He denies any fevers and chills.    Chief Complaint   Patient presents with    Hernia     Left inguinal        Review of patient's allergies indicates:  No Known Allergies    Current Outpatient Medications   Medication Sig Dispense Refill    methylPREDNISolone (MEDROL DOSEPACK) 4 mg tablet use as directed 21 each 3    naproxen (NAPROSYN) 500 MG tablet Take 1 tablet (500 mg total) by mouth 2 (two) times daily. 30 tablet 1     No current facility-administered medications for this visit.       Past Medical  History:   Diagnosis Date    Allergy     Anxiety     Arthritis     Closed skull fracture with intracranial hemorrhage, with loss of consciousness     over 24 hour period    Neuromuscular disorder     Obstructive sleep apnea on CPAP     PTSD (post-traumatic stress disorder)      Past Surgical History:   Procedure Laterality Date    FRACTURE SURGERY      left femur    menicus repair      ROTATOR CUFF REPAIR      left     No family history on file.  Social History     Tobacco Use    Smoking status: Never    Smokeless tobacco: Never   Substance Use Topics    Alcohol use: No    Drug use: No        Review of Systems:  Review of Systems   Constitutional:  Negative for chills, fatigue, fever and unexpected weight change.   HENT:  Negative for congestion.    Eyes:  Negative for visual disturbance.   Respiratory:  Negative for shortness of breath.    Cardiovascular:  Negative for chest pain.   Gastrointestinal:  Negative for abdominal distention, abdominal pain, constipation, nausea, rectal pain and vomiting.   Genitourinary:  Negative for dysuria.   Musculoskeletal:  Positive for arthralgias and joint swelling.   Skin:  Negative for rash.   Neurological:  Negative for light-headedness.   Hematological:  Negative for adenopathy. Does not bruise/bleed easily.          Objective     Vital Signs (Most Recent)  Pulse: 72 (12/16/24 0914)  BP: 122/86 (12/16/24 0914)     90.4 kg (199 lb 4.7 oz)     Physical Exam:  Physical Exam  Vitals reviewed.   Constitutional:       General: He is not in acute distress.     Appearance: He is well-developed. He is not toxic-appearing.   HENT:      Head: Normocephalic and atraumatic.      Right Ear: External ear normal.      Left Ear: External ear normal.      Nose: Nose normal.      Mouth/Throat:      Mouth: Mucous membranes are moist.   Eyes:      Extraocular Movements: Extraocular movements intact.      Conjunctiva/sclera: Conjunctivae normal.   Cardiovascular:      Rate and Rhythm: Normal rate.    Pulmonary:      Effort: Pulmonary effort is normal. No respiratory distress.   Abdominal:      Comments: Hard to appreciate left inguinal hernia on examination.    Musculoskeletal:      Cervical back: Normal range of motion and neck supple.   Lymphadenopathy:      Head:      Right side of head: No submental, submandibular, tonsillar, preauricular, posterior auricular or occipital adenopathy.      Left side of head: No submental, submandibular, tonsillar, preauricular, posterior auricular or occipital adenopathy.      Cervical: No cervical adenopathy.      Right cervical: No superficial, deep or posterior cervical adenopathy.     Left cervical: No superficial, deep or posterior cervical adenopathy.      Upper Body:      Right upper body: No supraclavicular, axillary, pectoral or epitrochlear adenopathy.      Left upper body: No supraclavicular, axillary, pectoral or epitrochlear adenopathy.      Lower Body: No right inguinal adenopathy. No left inguinal adenopathy.      Comments: No palpable lymphadenopathy on examination   Skin:     General: Skin is warm and dry.   Neurological:      Mental Status: He is alert and oriented to person, place, and time.   Psychiatric:         Behavior: Behavior normal.           Diagnostic Results:  U/S groin left 09/2024  FINDINGS:  At site of patient's concern, 2 inguinal lymph nodes are seen with normal fatty asa and no pathologic akilah enlargement.  Nodes measure 2.1 x 0.7 x 1.7 cm and 4.6 x 0.5 x 0.7 cm.  No concerning mass, fluid collection, hernia, or other significant sonographic abnormality seen.      U/S groin left 12/2024:  FINDINGS: Sonographic evaluation of the left inguinal region.  Grossly similar appearance of nonenlarged, benign-appearing left inguinal lymph nodes measuring 2.2 x 0.7 x 1.3 cm, previously 2.1 x 0.7 x 1.7 cm and 4.8 x 0.7 x 0.6 cm, previously 4.6 x 0.5 x 0.7 cm.     Just deep to the lymph nodes within the left inguinal region is the appearance of a  small fat-containing hernia.  Hernia defect measures roughly 1.4 cm.        Impression:   Similar size of nonenlarged, benign-appearing left inguinal lymph nodes as above.     Small fat-containing left inguinal hernia.     Assessment and Plan   45 y/o male with left inguinal hernia and left inguinal lymph nodes previous enlarged with biopsy requested by rheumatology.    - discussed anatomy related to inguinal hernias in men.   - discussed robotic inguinal hernia repair risks and benefits including but not limited to pain, bleeding, infection, recurrence, seroma, hematoma, damage to surrounding structures including the vas deferens or testicular vessels, recurrence, need for further procedures to address these issues.  Patient voiced understanding.  The operative surgeon obtained informed consent.  -discussed risks and benefits of lymph node biopsy including all of the above in addition to lymphocele and insufficient/abnormal pathology results.  The patient voiced understanding.  Operative surgeon obtained informed consent.  -preop: CBC and CMP  -discussed postoperative hernia restrictions including up to 6 weeks of light duty to reduce the risk of hernia recurrence.  -return to clinic 2 weeks postoperatively.        Zahra Juarez PA-C  [facility]      I spent a total of 45 minutes on the day of the visit.This includes face to face time and non-face to face time preparing to see the patient (eg, review of tests), obtaining and/or reviewing separately obtained history, documenting clinical information in the electronic or other health record, independently interpreting results and communicating results to the patient/family/caregiver, or care coordinator.

## 2024-12-17 ENCOUNTER — TELEPHONE (OUTPATIENT)
Dept: RADIOLOGY | Facility: HOSPITAL | Age: 44
End: 2024-12-17
Payer: OTHER GOVERNMENT

## 2024-12-17 NOTE — TELEPHONE ENCOUNTER
Interventional Radiology  Scheduled 7:30AM Left inguinal LN wire localization prior to excisional biopsy for 1/17/25 natalie/Nasreen at Dr. Alcala's office.  Instructed that pt. should arrive by 7:00AM to the hospital ([address]/ Entrance 2) off [address] in [place] and check in at Patient Registration on the first floor.

## 2025-01-02 ENCOUNTER — TELEPHONE (OUTPATIENT)
Dept: INFECTIOUS DISEASES | Facility: CLINIC | Age: 45
End: 2025-01-02

## 2025-01-02 ENCOUNTER — TELEPHONE (OUTPATIENT)
Dept: SURGERY | Facility: CLINIC | Age: 45
End: 2025-01-02

## 2025-01-02 NOTE — TELEPHONE ENCOUNTER
Attempted to touch base with patient regarding upcoming surgery. No answer and V/M is full. Will attempt again tomorrow.      Zahra Juarez PA-C  [facility]

## 2025-01-02 NOTE — TELEPHONE ENCOUNTER
----- Message from Med Assistant Macie sent at 1/2/2025  9:37 AM CST -----  Regarding: Appointment for ID  Good morning,     Can you all please assist in getting this patient scheduled for an urgent appointment. Please let me know when patient is scheduled.     Thank you in advance  ----- Message -----  From: Luan Nielsen MD  Sent: 1/2/2025   9:04 AM CST  To: Hugo Alcala MD; [facility] Staff    Absolutely. Staff, could you please contact Infectious Disease and get this patient scheduled with them right away?  ----- Message -----  From: Hugo Alcala MD  Sent: 1/2/2025   8:38 AM CST  To: Luan Nielsen MD    Currently he does not have an appointment for Infectious Disease.  A request has been placed but it has not been scheduled.  It would be great if you could look into this.      We can talk to him about fixing his hernia robotically so we will not be in the same plane as the lymph nodes and we could biopsy the lymph node in the future.  ----- Message -----  From: Luan Nielsen MD  Sent: 1/1/2025   9:07 PM CST  To: MD Chaitanya Antoine. Thanks for reaching out. If the lymph nodes look normal, then I would rather have him be evaluated by ID to determine next steps. We are looking to see if he has latent TB.  ----- Message -----  From: Hugo Alcala MD  Sent: 12/17/2024  11:07 AM CST  To: Luan Nielsen MD    The patient was seen in general surgery by my physician assistant and myself    Patient states you were requesting a left inguinal lymph node biopsy.  The only thing I see in the chart upon review of your notes is referred to Infectious Disease.      The patient has not yet been seen by Infectious Disease.  A request for an appointment as scheduled.      He is scheduled for hernia surgery    We could do a lymph node biopsy at that time however it would require a preoperative localization as he has no palpable lymph nodes    Are you wanting a lymph node biopsy or you  wanting him to see Infectious Disease to determine this.      He has had a follow up ultrasound that shows no pathologic abnormalities.      If you could kindly let me know this would be greatly appreciated

## 2025-01-02 NOTE — TELEPHONE ENCOUNTER
I called the pt to offer tomorrow appt for 0930 with Dr. Fischer 1-3-25. No answer. Lvm of info. And asked pt to pls return call.

## 2025-01-03 ENCOUNTER — TELEPHONE (OUTPATIENT)
Dept: SURGERY | Facility: CLINIC | Age: 45
End: 2025-01-03

## 2025-01-03 NOTE — TELEPHONE ENCOUNTER
Attempted to call patient again about upcoming surgery. VM not available.    Zahra Juarez PA-C  [facility]

## 2025-01-06 ENCOUNTER — TELEPHONE (OUTPATIENT)
Dept: SURGERY | Facility: CLINIC | Age: 45
End: 2025-01-06

## 2025-01-06 ENCOUNTER — TELEPHONE (OUTPATIENT)
Dept: INFECTIOUS DISEASES | Facility: CLINIC | Age: 45
End: 2025-01-06

## 2025-01-06 NOTE — TELEPHONE ENCOUNTER
----- Message from Zahra Juarez PA-C sent at 1/6/2025  1:11 PM CST -----  We would like this patient to be seen as soon as possible, hopefully prior to scheduled surgery on 01/17 so we will know if he needs concurrent lymph node biopsy or not.  I know he was scheduled last week and did not show up or answer phone calls.  He is in the  and was indisposed in [place] secondary to recent events.  Please reach out as soon as possible.  He will be awaiting a call.    Zahra Juarez PA-C  [facility]

## 2025-01-06 NOTE — TELEPHONE ENCOUNTER
Called patient about surgery.  Advised that Dr. Nielsen we would like him to see infectious disease prior to undergoing lymph node biopsy.  Advised that at this time we will plan on robotic repair of the hernia and no lymph node biopsy unless Infectious Disease deems it necessary.  We will reach back out to Infectious Disease to have them get the patient scheduled hopefully prior to surgery.    Zahra Juarez PA-C  [facility]        ----- Message from Hugo Alcala MD sent at 1/2/2025  7:23 AM CST -----  We can hold off on any surgery until after he sees Infectious Disease.      Please let me know what the results of the Infectious Disease evaluation are  ----- Message -----  From: Luan Nielsen MD  Sent: 1/1/2025   9:07 PM CST  To: MD Randy Antoine. Thanks for reaching out. If the lymph nodes look normal, then I would rather have him be evaluated by ID to determine next steps. We are looking to see if he has latent TB.  ----- Message -----  From: Hugo Alcala MD  Sent: 12/17/2024  11:07 AM CST  To: Luan Nielsen MD    The patient was seen in general surgery by my physician assistant and myself    Patient states you were requesting a left inguinal lymph node biopsy.  The only thing I see in the chart upon review of your notes is referred to Infectious Disease.      The patient has not yet been seen by Infectious Disease.  A request for an appointment as scheduled.      He is scheduled for hernia surgery    We could do a lymph node biopsy at that time however it would require a preoperative localization as he has no palpable lymph nodes    Are you wanting a lymph node biopsy or you wanting him to see Infectious Disease to determine this.      He has had a follow up ultrasound that shows no pathologic abnormalities.      If you could kindly let me know this would be greatly appreciated

## 2025-01-06 NOTE — TELEPHONE ENCOUNTER
Pt scheduled from referral placed to ID. Pt verbalized understanding and agreed to appt date, time, and location.

## 2025-01-08 ENCOUNTER — OFFICE VISIT (OUTPATIENT)
Dept: INFECTIOUS DISEASES | Facility: CLINIC | Age: 45
End: 2025-01-08
Payer: OTHER GOVERNMENT

## 2025-01-08 ENCOUNTER — LAB VISIT (OUTPATIENT)
Dept: LAB | Facility: HOSPITAL | Age: 45
End: 2025-01-08
Attending: STUDENT IN AN ORGANIZED HEALTH CARE EDUCATION/TRAINING PROGRAM
Payer: OTHER GOVERNMENT

## 2025-01-08 VITALS
SYSTOLIC BLOOD PRESSURE: 118 MMHG | HEART RATE: 66 BPM | DIASTOLIC BLOOD PRESSURE: 72 MMHG | WEIGHT: 206.38 LBS | HEIGHT: 71 IN | RESPIRATION RATE: 20 BRPM | BODY MASS INDEX: 28.89 KG/M2

## 2025-01-08 DIAGNOSIS — R76.12 POSITIVE QUANTIFERON-TB GOLD TEST: ICD-10-CM

## 2025-01-08 DIAGNOSIS — R50.9 FUO (FEVER OF UNKNOWN ORIGIN): ICD-10-CM

## 2025-01-08 DIAGNOSIS — R59.9 LYMPH NODE ENLARGEMENT: ICD-10-CM

## 2025-01-08 DIAGNOSIS — R50.9 FUO (FEVER OF UNKNOWN ORIGIN): Primary | ICD-10-CM

## 2025-01-08 PROCEDURE — 99999 PR PBB SHADOW E&M-EST. PATIENT-LVL IV: CPT | Mod: PBBFAC,,, | Performed by: STUDENT IN AN ORGANIZED HEALTH CARE EDUCATION/TRAINING PROGRAM

## 2025-01-08 PROCEDURE — 99214 OFFICE O/P EST MOD 30 MIN: CPT | Mod: PBBFAC | Performed by: STUDENT IN AN ORGANIZED HEALTH CARE EDUCATION/TRAINING PROGRAM

## 2025-01-08 PROCEDURE — 0152U NFCT DS DNA UNTRGT NGNRJ SEQ: CPT | Performed by: STUDENT IN AN ORGANIZED HEALTH CARE EDUCATION/TRAINING PROGRAM

## 2025-01-08 NOTE — PROGRESS NOTES
"Infectious Disease Clinic Note    Patient Name: Mickey Dominguez  YOB: 1980    PRESENTING HISTORY       History of Present Illness:  Mr. Mickey Dominguez is a 44 y.o. male w/ significant PMHx of CLAUDE and arthritis referred by rheumatology for positive quant gold and inguinal lymphadenopathy (LAD). Referred to general surgery for hernia repair and possible lymph node biopsy. Plan is to repair hernia robotically. With regard to TB risk factors: +international travel ([place], [place]) and is active , negative for HIV, IVDU, incarceration, or homelessness. Currently without respiratory symptoms. CXR in November was negative. Has been having a lot of arthritis and joint aches along with night sweats. Initially high fever but no longer. Swelling aside from feet is recent. Syphilis testing and HIV negative. Patient also mentions exposure to livestock. Discussed case with surgical team. Will plan for lymph node biopsy given travel history and positive quant gold. Will also order Karius today and check additional serologies given constellation of symptoms and exposure risks. If both biopsy and Karius negative will plan to begin latent TB therapy with rifampin x 4 months. Patient voiced understanding.      Last rheumatology note reviewed: "For the past 10 years he's been getting flare ups of joint pain with swelling. Initially affecting low back, feet, toes, ankles with erythema and swelling in the distal joints and soft tissue, sometimes will tiny pustules over the heels. He showed me a photo from his phone of swollen and red toes during a flare up. Then flares started involving elbows, wrists, MCPs, PIPs. He feels widespread pain during flares, like he's 89 years old he says. Gets blurry vision during flares. Flare ups might happen twice a year or 8 times a year. Flare ups used to last a few days. However a few months ago he had a flare lasting 10 weeks. Denies fever during " "flare ups. Prednisone seems to help. Between flare ups he feels great. 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 He's had exposure to harmful chemicals and infections. 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 He reports MRI in the  showed spondylosis in the spine. Various infection tests have been negative so far. RF, CCP, MARIE are negative. ESR was elevated recently even after a course of prednisone for a recent flare.     Symptoms of intermittent widespread arthralgias and generalized joint or soft tissue swelling. Differential for the flare ups includes connective tissue disease, inflammatory arthritis, seronegative spondyloarthritis, reactive arthritis, infectious arthritis, among others. We checked labs here and inflammatory markers returned to normal (not flaring at the time). Quantiferon is positive. Rheumatology initial workup labs are unrevealing so will do further lab testing today. XR negative except for lumbar degenerative disease. CXR normal. He reports having negative PPD 2 years ago. Has been deployed in the past 2 years.      Plan:  ? Labs for scleroderma, myositis, vasculitis.  ? Referral to ID for positive Quantiferon and for any possible underlying infection which might cause his symptoms.  ? If TB is treated and any active infection causing the symptoms is ruled out, we can consider treating empirically for seronegative RA or SpA."     General surgery note reviewed: "Patient is a 43 y.o. male who presents for evaluation of left inguinal lymphadenopathy noted on recent U/S. He is in the  and was participating in a fitness test last December when he felt like he injured his left groin. He initially felt like this was a muscle strain, but the pain persisted in the area for several months.  Since then, he has had onset of significant joint pain in several joints throughout his body associated " "with swelling of his extremities and a discoid rash at times.  He is undergoing rheumatologic/autoimmune workup with a his PCP and has been referred to Rheumatology.  He underwent ultrasound of his left groin in July without evidence of hernia but with some prominent lymph nodes as below. Denies any family or personal history of cancer. Denies any unexpected weight loss, fevers, chills, night sweats, nausea, or vomiting. Denies any recent infections in the groin area, including STIs.     45 y/o male with left inguinal hernia and left inguinal lymph nodes previous enlarged with biopsy requested by rheumatology.     - discussed anatomy related to inguinal hernias in men.   - discussed robotic inguinal hernia repair risks and benefits including but not limited to pain, bleeding, infection, recurrence, seroma, hematoma, damage to surrounding structures including the vas deferens or testicular vessels, recurrence, need for further procedures to address these issues.  Patient voiced understanding.  The operative surgeon obtained informed consent.  -discussed risks and benefits of lymph node biopsy including all of the above in addition to lymphocele and insufficient/abnormal pathology results.  The patient voiced understanding.  Operative surgeon obtained informed consent."     EXAM: US SOFT TISSUE, GROIN LEFT     CLINICAL HISTORY: Interval evaluation of lymph nodes; enlarged lymph nodes, unspecified     COMPARISON: Head and neck ultrasound 07/01/2024     FINDINGS: Sonographic evaluation of the left inguinal region.  Grossly similar appearance of nonenlarged, benign-appearing left inguinal lymph nodes measuring 2.2 x 0.7 x 1.3 cm, previously 2.1 x 0.7 x 1.7 cm and 4.8 x 0.7 x 0.6 cm, previously 4.6 x 0.5 x 0.7 cm.     Just deep to the lymph nodes within the left inguinal region is the appearance of a small fat-containing hernia.  Hernia defect measures roughly 1.4 cm.        Impression:   Similar size of nonenlarged, " benign-appearing left inguinal lymph nodes as above.     Small fat-containing left inguinal hernia.     Finalized on: 12/11/2024 3:50 PM By:  Aquilino Edwards MD  [facility]# [id number]      2024-12-11 15:52:[id number]    [facility]        Exam Ended: 12/11/24 09:54 CST Last Resulted: 12/11/24 15:50 CST         Review of Systems:  Constitutional: prior fever; night sweats   Eyes: no visual changes  ENT: no nasal congestion or sore throat  Respiratory: no cough or shortness of breath  Cardiovascular: no chest pain  Gastrointestinal: no nausea or vomiting, no abdominal pain, no constipation, no diarrhea  Musculoskeletal: myalgias and arthralgias   Skin: lesion on right shin; no drainage; prior ringworm   Neurological: no headaches, numbness, or paresthesias    The following portions of the patient's history were reviewed and updated as appropriate: allergies, current medications, past family history, past medical history, past social history, past surgical history, and problem list.    PAST HISTORY:       There is no immunization history on file for this patient.    Past Medical History:   Diagnosis Date    Allergy     Anxiety     Arthritis     Closed skull fracture with intracranial hemorrhage, with loss of consciousness     over 24 hour period    Neuromuscular disorder     Obstructive sleep apnea on CPAP     PTSD (post-traumatic stress disorder)        Past Surgical History:   Procedure Laterality Date    FRACTURE SURGERY      left femur    menicus repair      ROTATOR CUFF REPAIR      left       No family history on file.    Social History     Socioeconomic History    Marital status:    Tobacco Use    Smoking status: Never    Smokeless tobacco: Never   Substance and Sexual Activity    Alcohol use: No    Drug use: No    Sexual activity: Yes     Partners: Female     Social Drivers of Health     Financial Resource Strain: Medium Risk (7/15/2024)    Overall Financial Resource Strain (CARDIA)     Difficulty of Paying Living  "Expenses: Somewhat hard   Food Insecurity: No Food Insecurity (7/15/2024)    Hunger Vital Sign     Worried About Running Out of Food in the Last Year: Never true     Ran Out of Food in the Last Year: Never true   Physical Activity: Sufficiently Active (7/15/2024)    Exercise Vital Sign     Days of Exercise per Week: 5 days     Minutes of Exercise per Session: 60 min   Stress: Stress Concern Present (7/15/2024)    [employer] - Occupational Stress Questionnaire     Feeling of Stress : To some extent   Housing Stability: Unknown (7/15/2024)    Housing Stability Vital Sign     Unable to Pay for Housing in the Last Year: No       MEDICATIONS & ALLERGIES:     Current Outpatient Medications on File Prior to Visit   Medication Sig    naproxen (NAPROSYN) 500 MG tablet Take 1 tablet (500 mg total) by mouth 2 (two) times daily.     No current facility-administered medications on file prior to visit.       Review of patient's allergies indicates:  No Known Allergies    OBJECTIVE:   Vital Signs:  Vitals:    01/08/25 1518   BP: 118/72   Pulse: 66   Resp: 20   Weight: 93.6 kg (206 lb 5.6 oz)   Height: 5' 11" (1.803 m)       No results found for this or any previous visit (from the past 24 hours).      Physical Exam:   General:  Well developed, well nourished, no acute distress  HEENT:  Normocephalic, atraumatic  CVS:  RRR, S1 and S2 normal, no murmurs, rubs, gallops  Resp:  Lungs clear to auscultation, no wheezes, rales, rhonchi  GI:  Abdomen soft, non-tender, non-distended, normoactive bowel sounds, no masses  MSK:  No muscle atrophy, peripheral edema, full range of motion  Skin:  No rashes, ulcers, erythema  Psych:  Alert and oriented to person, place, and time    ASSESSMENT:     Positive quant gold  --CXR normal  --Due to exposure risks recommend proceeding with lymph node biopsy   --Would stain for AFB and perform cytology to rule out malignancy   --Will check Karius today  --If both biopsy and " Karius negative will plan for 4 month course of rifampin to treat latent TB   --Follow up with me in 4 weeks     Lymphadenopathy   --Exposure risks including arthropods and livestock  --Will check Brucella, Bartonella, and Q fever   --Check WNV Abs, Lyme, and RMSF given arthritis/myalgias   --Will order CT chest/abdomen/pelvis to ensure no mass or LAD elsewhere     Hernia  No contraindication to proceed with repair from ID standpoint     PLAN:     Mickey was seen today for enlarge lymph node- hernia and tb exposure.    Diagnoses and all orders for this visit:    FUO (fever of unknown origin)  -     Misc Sendout Test, Blood Karius; Future  -     WEST NILE ANTIBODIES, IGG AND IGM; Future  -     LYME DISEASE ANTIBODY BY EIA; Future  -     Brucella species antibody; Future  -     BARTONELLA ANITBODY PANEL; Future  -     Spotted Fever Group Antibodies; Future  -     Q Fever Ab Screen w/Titer Reflex; Future  -     Coccidioides Ab with Reflex; Future  -     Cryptococcal antigen, blood; Future    Lymph node enlargement  -     Ambulatory referral/consult to Infectious Disease  -     CT Chest Abdomen Pelvis With IV Contrast (XPD) Routine Oral Contrast; Future  -     WEST NILE ANTIBODIES, IGG AND IGM; Future  -     LYME DISEASE ANTIBODY BY EIA; Future  -     Brucella species antibody; Future  -     BARTONELLA ANITBODY PANEL; Future  -     Spotted Fever Group Antibodies; Future  -     Q Fever Ab Screen w/Titer Reflex; Future  -     Coccidioides Ab with Reflex; Future  -     Cryptococcal antigen, blood; Future    Positive QuantiFERON-TB Gold test  -     Ambulatory referral/consult to Infectious Disease  -     CT Chest Abdomen Pelvis With IV Contrast (XPD) Routine Oral Contrast; Future          The total time for evaluation and management services performed on 1/8/25 was greater than 45 minutes.        Byron Fischer, DO   Infectious Diseases

## 2025-01-13 ENCOUNTER — PATIENT MESSAGE (OUTPATIENT)
Dept: SURGERY | Facility: CLINIC | Age: 45
End: 2025-01-13
Payer: OTHER GOVERNMENT

## 2025-01-14 ENCOUNTER — PATIENT MESSAGE (OUTPATIENT)
Dept: PREADMISSION TESTING | Facility: HOSPITAL | Age: 45
End: 2025-01-14
Payer: OTHER GOVERNMENT

## 2025-01-14 ENCOUNTER — PATIENT MESSAGE (OUTPATIENT)
Dept: INFECTIOUS DISEASES | Facility: CLINIC | Age: 45
End: 2025-01-14
Payer: OTHER GOVERNMENT

## 2025-01-14 LAB
MISCELLANEOUS TEST NAME: NORMAL
REFERENCE LAB: NORMAL
SPECIMEN TYPE: NORMAL
TEST RESULT: NORMAL

## 2025-01-14 RX ORDER — DOXYCYCLINE 100 MG/1
100 CAPSULE ORAL 2 TIMES DAILY
Qty: 20 CAPSULE | Refills: 0 | Status: SHIPPED | OUTPATIENT
Start: 2025-01-14 | End: 2025-01-24

## 2025-01-15 ENCOUNTER — TELEPHONE (OUTPATIENT)
Dept: INFECTIOUS DISEASES | Facility: CLINIC | Age: 45
End: 2025-01-15
Payer: OTHER GOVERNMENT

## 2025-01-15 ENCOUNTER — HOSPITAL ENCOUNTER (OUTPATIENT)
Dept: RADIOLOGY | Facility: HOSPITAL | Age: 45
Discharge: HOME | End: 2025-01-15
Attending: STUDENT IN AN ORGANIZED HEALTH CARE EDUCATION/TRAINING PROGRAM
Payer: OTHER GOVERNMENT

## 2025-01-15 DIAGNOSIS — R76.12 POSITIVE QUANTIFERON-TB GOLD TEST: ICD-10-CM

## 2025-01-15 DIAGNOSIS — R59.9 LYMPH NODE ENLARGEMENT: ICD-10-CM

## 2025-01-15 PROCEDURE — 25500020 PHARM REV CODE 255: Performed by: STUDENT IN AN ORGANIZED HEALTH CARE EDUCATION/TRAINING PROGRAM

## 2025-01-15 PROCEDURE — 71260 CT THORAX DX C+: CPT | Mod: 26,,, | Performed by: RADIOLOGY

## 2025-01-15 PROCEDURE — 74177 CT ABD & PELVIS W/CONTRAST: CPT | Mod: 26,,, | Performed by: RADIOLOGY

## 2025-01-15 PROCEDURE — 71260 CT THORAX DX C+: CPT | Mod: TC

## 2025-01-15 RX ADMIN — IOHEXOL 100 ML: 350 INJECTION, SOLUTION INTRAVENOUS at 10:01

## 2025-01-15 RX ADMIN — IOHEXOL 30 ML: 350 INJECTION, SOLUTION INTRAVENOUS at 09:01

## 2025-01-15 NOTE — PRE-PROCEDURE INSTRUCTIONS
Pre op instructions reviewed with pt over telephone, verbalized understanding.    Procedure Date: 1/17/25  Arrival Time:  TBD; We will call you after 2pm the day before your procedure with your arrival time.    Address:   [facility] (Off [facility], 2nd Building on the left)  [id number] [place] [address] [place]. [address]  >>>Please enter through front entrance Lobby of 1st floor to Registration desk<<<      !!!INSTRUCTIONS IMPORTANT!!!  NO FOOD or tobacco products after midnight the night before surgery! You may have clear liquids up to 3 hrs before your arrival to the Hospital  Clear liquids include Gatorade, water, soda, black coffee or tea (no milk or creamer), and clear juices.  Clear liquids do NOT include anything with pulp or food particles (Chicken broth, ice cream, yogurt, Jello, etc.)    >>>MEDICATION INSTRUCTIONS<<<: Morning of Surgery, take small sip of water with ONLY these medications:  None     *Diabetic/ Prediabetic Patients: !!!If you take diabetic or weight loss medication, Do NOT take morning of surgery unless instructed by Doctor!!!  Metformin to be stopped 24 hrs prior to surgery.   Long Acting Insulin Instructions: HOLD the night before surgery unless instructed differently by Provider!  Ozempic/ Mounjaro/ Wegovy/ Trulicity/ Semaglutide injections or weight loss medication to be stopped 7 days prior to surgery.    !!!STOP ALL Aspirins, NSAIDS, WEIGHT LOSS INJECTIONS/PILLS, Herbal supplements, & Vitamins 7 DAYS BEFORE SURGERY!!!    ____  Avoid Alcoholic beverages 3 days prior to surgery, as it can thin the blood.  ____  NO Acrylic/fake nails or nail polish worn day of surgery (specifically hand/arm & foot surgeries).  ____  NO powder, lotions, deodorants, oils or cream on body.  ____  Remove all jewelry & piercings & foreign objects before arrival & leave at home.  ____  Remove Dentures, Hearing Aids & Contact Lens prior to surgery.  ____  Bring photo ID and insurance information to  hospital (Leave Valuables at Home).  ____  If going home the same day, arrange for a ride home. You will not be able to drive for 24 hrs if Anesthesia was used.   ____  Females (ages 11-60): may need to give a urine sample the morning of surgery; please see Pre op Nurse prior to using the restroom.  ____  Males: Stop ED medications (Viagra, Cialis) 24 hrs prior to surgery.  ____  Wear clean, loose fitting clothing to allow for dressings/ bandages.      Bathing Instructions:    -Shower with anti-bacterial Soap (Hibiclens or Dial) the night before surgery and the morning of.   -Do not use Hibiclens on your face or genitals.   -Apply clean clothes after shower.  -Do not shave your face or body 2 days prior to surgery unless instructed otherwise by your Surgeon.  -Do not shave pubic hair 7 days prior to surgery (gyn pt's).    [facility] Visitor/Ride Policy:  Only 2 adults allowed in pre op/recovery area during your procedure. You MUST HAVE A RIDE HOME from a responsible adult that you know and trust. Medical Transport, Uber or Lyft can ONLY be used if patient has a responsible adult to accompany them during ride home.       *Signs and symptoms of Infection Before or After Surgery:               !!!If you experience any fever, chills, nausea/ vomiting, foul odor/ excessive drainage from surgical site, flu-like symptoms, new wounds or cuts, PLEASE CALL THE SURGEON OFFICE at [phone] or SEND MESSAGE THROUGH [facility] PORTAL!!!     *If you are running late the morning of surgery, please call the Hospital Surgery Dept @ [phone].     *Billing questions:  [phone]     Thank you,  -[facility] Pre Admit Dept.  [phone] or [phone]  M-F 7:30 am-4:00 pm (Closed Major Holidays)

## 2025-01-15 NOTE — TELEPHONE ENCOUNTER
Spoke with the pt. Informed the Dr. Fischer said to go ahead and start the ABT now it will not interfere with procedure.repeated back and voiced understand.

## 2025-01-15 NOTE — TELEPHONE ENCOUNTER
----- Message from ROSEANN Galdamez sent at 1/15/2025  9:29 AM CST -----  Regarding: surgery 1/17  Hello,   This pt will be having hernia surgery on 1/17/25 with Dr Alcala. I spoke with the patient today and he informed me that he has not started the prescribed antibiotic you gave him yet. I advised he start it today, but want to know if this will cause an issue with surgery on Friday? Please advise.      -Thanks in advance,  [facility] Pre Admit Dept.  [phone] or [phone]  Mon-Fri 7:30 am- 4 pm (Closed Major Holidays)

## 2025-01-16 ENCOUNTER — PATIENT MESSAGE (OUTPATIENT)
Dept: PREADMISSION TESTING | Facility: HOSPITAL | Age: 45
End: 2025-01-16
Payer: OTHER GOVERNMENT

## 2025-01-17 ENCOUNTER — HOSPITAL ENCOUNTER (OUTPATIENT)
Facility: HOSPITAL | Age: 45
Discharge: HOME | End: 2025-01-17
Attending: SURGERY | Admitting: SURGERY
Payer: OTHER GOVERNMENT

## 2025-01-17 ENCOUNTER — ANESTHESIA EVENT (OUTPATIENT)
Dept: SURGERY | Facility: HOSPITAL | Age: 45
End: 2025-01-17
Payer: OTHER GOVERNMENT

## 2025-01-17 ENCOUNTER — ANESTHESIA (OUTPATIENT)
Dept: SURGERY | Facility: HOSPITAL | Age: 45
End: 2025-01-17
Payer: OTHER GOVERNMENT

## 2025-01-17 ENCOUNTER — HOSPITAL ENCOUNTER (OUTPATIENT)
Dept: RADIOLOGY | Facility: HOSPITAL | Age: 45
Discharge: HOME | End: 2025-01-17
Admitting: SURGERY
Payer: OTHER GOVERNMENT

## 2025-01-17 VITALS
BODY MASS INDEX: 28.09 KG/M2 | TEMPERATURE: 98 F | OXYGEN SATURATION: 99 % | HEIGHT: 71 IN | DIASTOLIC BLOOD PRESSURE: 82 MMHG | RESPIRATION RATE: 14 BRPM | HEART RATE: 59 BPM | WEIGHT: 200.63 LBS | SYSTOLIC BLOOD PRESSURE: 128 MMHG

## 2025-01-17 VITALS
RESPIRATION RATE: 17 BRPM | SYSTOLIC BLOOD PRESSURE: 132 MMHG | DIASTOLIC BLOOD PRESSURE: 80 MMHG | HEART RATE: 59 BPM | OXYGEN SATURATION: 99 %

## 2025-01-17 DIAGNOSIS — K40.90 LEFT INGUINAL HERNIA: ICD-10-CM

## 2025-01-17 DIAGNOSIS — R59.9 LYMPH NODE ENLARGEMENT: ICD-10-CM

## 2025-01-17 PROCEDURE — 88342 IMHCHEM/IMCYTCHM 1ST ANTB: CPT | Performed by: PATHOLOGY

## 2025-01-17 PROCEDURE — 71000033 HC RECOVERY, INTIAL HOUR: Performed by: SURGERY

## 2025-01-17 PROCEDURE — 25000003 PHARM REV CODE 250: Performed by: ANESTHESIOLOGY

## 2025-01-17 PROCEDURE — 88307 TISSUE EXAM BY PATHOLOGIST: CPT | Performed by: PATHOLOGY

## 2025-01-17 PROCEDURE — 36000710: Performed by: SURGERY

## 2025-01-17 PROCEDURE — 88341 IMHCHEM/IMCYTCHM EA ADD ANTB: CPT | Mod: 26,,, | Performed by: PATHOLOGY

## 2025-01-17 PROCEDURE — 38500 BIOPSY/REMOVAL LYMPH NODES: CPT | Mod: 51,LT,, | Performed by: SURGERY

## 2025-01-17 PROCEDURE — 87206 SMEAR FLUORESCENT/ACID STAI: CPT | Performed by: SURGERY

## 2025-01-17 PROCEDURE — 87116 MYCOBACTERIA CULTURE: CPT | Performed by: SURGERY

## 2025-01-17 PROCEDURE — 88189 FLOWCYTOMETRY/READ 16 & >: CPT | Mod: ,,, | Performed by: PATHOLOGY

## 2025-01-17 PROCEDURE — 37000008 HC ANESTHESIA 1ST 15 MINUTES: Performed by: SURGERY

## 2025-01-17 PROCEDURE — 76942 ECHO GUIDE FOR BIOPSY: CPT | Mod: 26,,, | Performed by: RADIOLOGY

## 2025-01-17 PROCEDURE — 71000039 HC RECOVERY, EACH ADD'L HOUR: Performed by: SURGERY

## 2025-01-17 PROCEDURE — 87176 TISSUE HOMOGENIZATION CULTR: CPT | Performed by: SURGERY

## 2025-01-17 PROCEDURE — 88185 FLOWCYTOMETRY/TC ADD-ON: CPT | Mod: 59 | Performed by: PATHOLOGY

## 2025-01-17 PROCEDURE — 63600175 PHARM REV CODE 636 W HCPCS: Performed by: SURGERY

## 2025-01-17 PROCEDURE — 63600175 PHARM REV CODE 636 W HCPCS: Performed by: NURSE ANESTHETIST, CERTIFIED REGISTERED

## 2025-01-17 PROCEDURE — C1781 MESH (IMPLANTABLE): HCPCS | Performed by: SURGERY

## 2025-01-17 PROCEDURE — 49650 LAP ING HERNIA REPAIR INIT: CPT | Mod: LT,,, | Performed by: SURGERY

## 2025-01-17 PROCEDURE — 88342 IMHCHEM/IMCYTCHM 1ST ANTB: CPT | Mod: 26,59,, | Performed by: PATHOLOGY

## 2025-01-17 PROCEDURE — 76942 ECHO GUIDE FOR BIOPSY: CPT | Mod: TC

## 2025-01-17 PROCEDURE — 63600175 PHARM REV CODE 636 W HCPCS: Mod: JZ,TB | Performed by: ANESTHESIOLOGY

## 2025-01-17 PROCEDURE — 71000015 HC POSTOP RECOV 1ST HR: Performed by: SURGERY

## 2025-01-17 PROCEDURE — 25000003 PHARM REV CODE 250: Performed by: NURSE ANESTHETIST, CERTIFIED REGISTERED

## 2025-01-17 PROCEDURE — 88307 TISSUE EXAM BY PATHOLOGIST: CPT | Mod: 26,,, | Performed by: PATHOLOGY

## 2025-01-17 PROCEDURE — 88341 IMHCHEM/IMCYTCHM EA ADD ANTB: CPT | Mod: 59 | Performed by: PATHOLOGY

## 2025-01-17 PROCEDURE — 87070 CULTURE OTHR SPECIMN AEROBIC: CPT | Performed by: SURGERY

## 2025-01-17 PROCEDURE — 37000009 HC ANESTHESIA EA ADD 15 MINS: Performed by: SURGERY

## 2025-01-17 PROCEDURE — 87102 FUNGUS ISOLATION CULTURE: CPT | Performed by: SURGERY

## 2025-01-17 PROCEDURE — 36000711: Performed by: SURGERY

## 2025-01-17 PROCEDURE — 88184 FLOWCYTOMETRY/ TC 1 MARKER: CPT | Performed by: PATHOLOGY

## 2025-01-17 PROCEDURE — 27201423 OPTIME MED/SURG SUP & DEVICES STERILE SUPPLY: Performed by: SURGERY

## 2025-01-17 DEVICE — 3DMAX MID ANATOMICAL MESH, 10 CM X 16 CM (4" X 6"), LARGE, LEFT
Type: IMPLANTABLE DEVICE | Site: INGUINAL | Status: FUNCTIONAL
Brand: 3DMAX

## 2025-01-17 RX ORDER — FENTANYL CITRATE 50 UG/ML
INJECTION, SOLUTION INTRAMUSCULAR; INTRAVENOUS
Status: DISCONTINUED | OUTPATIENT
Start: 2025-01-17 | End: 2025-01-17

## 2025-01-17 RX ORDER — ROCURONIUM BROMIDE 10 MG/ML
INJECTION, SOLUTION INTRAVENOUS
Status: DISCONTINUED | OUTPATIENT
Start: 2025-01-17 | End: 2025-01-17

## 2025-01-17 RX ORDER — LIDOCAINE HYDROCHLORIDE 20 MG/ML
INJECTION, SOLUTION EPIDURAL; INFILTRATION; INTRACAUDAL; PERINEURAL
Status: DISCONTINUED | OUTPATIENT
Start: 2025-01-17 | End: 2025-01-17

## 2025-01-17 RX ORDER — KETAMINE HCL IN 0.9 % NACL 50 MG/5 ML
SYRINGE (ML) INTRAVENOUS
Status: DISCONTINUED | OUTPATIENT
Start: 2025-01-17 | End: 2025-01-17

## 2025-01-17 RX ORDER — ONDANSETRON HYDROCHLORIDE 2 MG/ML
4 INJECTION, SOLUTION INTRAVENOUS DAILY PRN
Status: DISCONTINUED | OUTPATIENT
Start: 2025-01-17 | End: 2025-01-17 | Stop reason: HOSPADM

## 2025-01-17 RX ORDER — HYDROCODONE BITARTRATE AND ACETAMINOPHEN 5; 325 MG/1; MG/1
1 TABLET ORAL EVERY 4 HOURS PRN
Status: DISCONTINUED | OUTPATIENT
Start: 2025-01-17 | End: 2025-01-17 | Stop reason: HOSPADM

## 2025-01-17 RX ORDER — BUPIVACAINE HYDROCHLORIDE 2.5 MG/ML
INJECTION, SOLUTION EPIDURAL; INFILTRATION; INTRACAUDAL
Status: DISCONTINUED | OUTPATIENT
Start: 2025-01-17 | End: 2025-01-17 | Stop reason: HOSPADM

## 2025-01-17 RX ORDER — ONDANSETRON 8 MG/1
8 TABLET, ORALLY DISINTEGRATING ORAL EVERY 8 HOURS PRN
Status: CANCELLED | OUTPATIENT
Start: 2025-01-17

## 2025-01-17 RX ORDER — PROPOFOL 10 MG/ML
VIAL (ML) INTRAVENOUS
Status: DISCONTINUED | OUTPATIENT
Start: 2025-01-17 | End: 2025-01-17

## 2025-01-17 RX ORDER — DEXMEDETOMIDINE HYDROCHLORIDE 100 UG/ML
INJECTION, SOLUTION INTRAVENOUS
Status: DISCONTINUED | OUTPATIENT
Start: 2025-01-17 | End: 2025-01-17

## 2025-01-17 RX ORDER — CEFAZOLIN SODIUM 1 G/3ML
INJECTION, POWDER, FOR SOLUTION INTRAMUSCULAR; INTRAVENOUS
Status: DISCONTINUED | OUTPATIENT
Start: 2025-01-17 | End: 2025-01-17

## 2025-01-17 RX ORDER — SODIUM CHLORIDE, SODIUM LACTATE, POTASSIUM CHLORIDE, CALCIUM CHLORIDE 600; 310; 30; 20 MG/100ML; MG/100ML; MG/100ML; MG/100ML
INJECTION, SOLUTION INTRAVENOUS CONTINUOUS
Status: DISCONTINUED | OUTPATIENT
Start: 2025-01-17 | End: 2025-01-17 | Stop reason: HOSPADM

## 2025-01-17 RX ORDER — HYDROMORPHONE HYDROCHLORIDE 1 MG/ML
1 INJECTION, SOLUTION INTRAMUSCULAR; INTRAVENOUS; SUBCUTANEOUS EVERY 4 HOURS PRN
Status: CANCELLED | OUTPATIENT
Start: 2025-01-17

## 2025-01-17 RX ORDER — HYDROMORPHONE HYDROCHLORIDE 1 MG/ML
0.2 INJECTION, SOLUTION INTRAMUSCULAR; INTRAVENOUS; SUBCUTANEOUS EVERY 5 MIN PRN
Status: DISCONTINUED | OUTPATIENT
Start: 2025-01-17 | End: 2025-01-17 | Stop reason: HOSPADM

## 2025-01-17 RX ORDER — DEXAMETHASONE SODIUM PHOSPHATE 4 MG/ML
INJECTION, SOLUTION INTRA-ARTICULAR; INTRALESIONAL; INTRAMUSCULAR; INTRAVENOUS; SOFT TISSUE
Status: DISCONTINUED | OUTPATIENT
Start: 2025-01-17 | End: 2025-01-17

## 2025-01-17 RX ORDER — HYDROCODONE BITARTRATE AND ACETAMINOPHEN 5; 325 MG/1; MG/1
1 TABLET ORAL EVERY 6 HOURS PRN
Qty: 20 TABLET | Refills: 0 | Status: SHIPPED | OUTPATIENT
Start: 2025-01-17 | End: 2025-02-03

## 2025-01-17 RX ORDER — ONDANSETRON HYDROCHLORIDE 2 MG/ML
INJECTION, SOLUTION INTRAVENOUS
Status: DISCONTINUED | OUTPATIENT
Start: 2025-01-17 | End: 2025-01-17

## 2025-01-17 RX ORDER — HYDROCODONE BITARTRATE AND ACETAMINOPHEN 7.5; 325 MG/1; MG/1
1 TABLET ORAL EVERY 6 HOURS PRN
Status: DISCONTINUED | OUTPATIENT
Start: 2025-01-17 | End: 2025-01-17 | Stop reason: HOSPADM

## 2025-01-17 RX ORDER — KETOROLAC TROMETHAMINE 30 MG/ML
15 INJECTION, SOLUTION INTRAMUSCULAR; INTRAVENOUS EVERY 8 HOURS PRN
Status: DISCONTINUED | OUTPATIENT
Start: 2025-01-17 | End: 2025-01-17 | Stop reason: HOSPADM

## 2025-01-17 RX ORDER — OXYCODONE AND ACETAMINOPHEN 5; 325 MG/1; MG/1
1 TABLET ORAL
Status: DISCONTINUED | OUTPATIENT
Start: 2025-01-17 | End: 2025-01-17 | Stop reason: HOSPADM

## 2025-01-17 RX ORDER — ONDANSETRON HYDROCHLORIDE 8 MG/1
8 TABLET, FILM COATED ORAL EVERY 8 HOURS PRN
Qty: 20 TABLET | Refills: 0 | Status: SHIPPED | OUTPATIENT
Start: 2025-01-17 | End: 2025-02-03

## 2025-01-17 RX ORDER — MIDAZOLAM HYDROCHLORIDE 1 MG/ML
INJECTION INTRAMUSCULAR; INTRAVENOUS
Status: DISCONTINUED | OUTPATIENT
Start: 2025-01-17 | End: 2025-01-17

## 2025-01-17 RX ORDER — ONDANSETRON HYDROCHLORIDE 2 MG/ML
4 INJECTION, SOLUTION INTRAVENOUS EVERY 12 HOURS PRN
Status: DISCONTINUED | OUTPATIENT
Start: 2025-01-17 | End: 2025-01-17 | Stop reason: HOSPADM

## 2025-01-17 RX ORDER — CEFAZOLIN 2 G/1
2 INJECTION, POWDER, FOR SOLUTION INTRAMUSCULAR; INTRAVENOUS
Status: DISCONTINUED | OUTPATIENT
Start: 2025-01-17 | End: 2025-01-17 | Stop reason: HOSPADM

## 2025-01-17 RX ADMIN — PROPOFOL 150 MG: 10 INJECTION, EMULSION INTRAVENOUS at 08:01

## 2025-01-17 RX ADMIN — DEXMEDETOMIDINE 10 MCG: 200 INJECTION, SOLUTION INTRAVENOUS at 09:01

## 2025-01-17 RX ADMIN — MIDAZOLAM HYDROCHLORIDE 2 MG: 1 INJECTION, SOLUTION INTRAMUSCULAR; INTRAVENOUS at 08:01

## 2025-01-17 RX ADMIN — HYDROMORPHONE HYDROCHLORIDE 0.2 MG: 1 INJECTION, SOLUTION INTRAMUSCULAR; INTRAVENOUS; SUBCUTANEOUS at 11:01

## 2025-01-17 RX ADMIN — ROCURONIUM BROMIDE 20 MG: 10 SOLUTION INTRAVENOUS at 09:01

## 2025-01-17 RX ADMIN — ROCURONIUM BROMIDE 10 MG: 10 SOLUTION INTRAVENOUS at 10:01

## 2025-01-17 RX ADMIN — KETOROLAC TROMETHAMINE 15 MG: 30 INJECTION, SOLUTION INTRAMUSCULAR at 11:01

## 2025-01-17 RX ADMIN — OXYCODONE HYDROCHLORIDE AND ACETAMINOPHEN 1 TABLET: 5; 325 TABLET ORAL at 11:01

## 2025-01-17 RX ADMIN — SODIUM CHLORIDE, SODIUM LACTATE, POTASSIUM CHLORIDE, AND CALCIUM CHLORIDE: .6; .31; .03; .02 INJECTION, SOLUTION INTRAVENOUS at 08:01

## 2025-01-17 RX ADMIN — ROCURONIUM BROMIDE 50 MG: 10 SOLUTION INTRAVENOUS at 08:01

## 2025-01-17 RX ADMIN — FENTANYL CITRATE 100 MCG: 50 INJECTION, SOLUTION INTRAMUSCULAR; INTRAVENOUS at 08:01

## 2025-01-17 RX ADMIN — SUGAMMADEX 100 MG: 100 INJECTION, SOLUTION INTRAVENOUS at 10:01

## 2025-01-17 RX ADMIN — Medication 20 MG: at 09:01

## 2025-01-17 RX ADMIN — LIDOCAINE HYDROCHLORIDE 100 MG: 20 INJECTION, SOLUTION EPIDURAL; INFILTRATION; INTRACAUDAL; PERINEURAL at 08:01

## 2025-01-17 RX ADMIN — CEFAZOLIN 2 G: 330 INJECTION, POWDER, FOR SOLUTION INTRAMUSCULAR; INTRAVENOUS at 09:01

## 2025-01-17 RX ADMIN — ONDANSETRON 4 MG: 2 INJECTION INTRAMUSCULAR; INTRAVENOUS at 10:01

## 2025-01-17 RX ADMIN — DEXAMETHASONE SODIUM PHOSPHATE 8 MG: 4 INJECTION, SOLUTION INTRA-ARTICULAR; INTRALESIONAL; INTRAMUSCULAR; INTRAVENOUS; SOFT TISSUE at 09:01

## 2025-01-17 NOTE — H&P
ubjective  History of Present Illness:  Patient is a 44 y.o. male who presents for evaluation of left inguinal lymphadenopathy noted on recent U/S. He is in the  and was participating in a fitness test last December when he felt like he injured his left groin. He initially felt like this was a muscle strain, but the pain persisted in the area for several months.  Since then, he has had onset of significant joint pain in several joints throughout his body associated with swelling of his extremities and a discoid rash at times.  He is undergoing rheumatologic/autoimmune workup with a his PCP and has been referred to Rheumatology.  He underwent ultrasound of his left groin in July without evidence of hernia but with some prominent lymph nodes as below. Denies any family or personal history of cancer. Denies any unexpected weight loss, fevers, chills, night sweats, nausea, or vomiting. Denies any recent infections in the groin area, including STIs.     Interval History:  Since last clinic visit, the patient underwent interval imaging of his left inguinal lymph nodes.  An inguinal hernia was discovered, the patient is still having groin pain.  The lymph nodes in the groin were benign-appearing, but he states that his rheumatologist is requesting a biopsy of the lymph nodes in the area.  He is tolerating a regular diet and having normal bowel movements.  He is urinating without issue.  He denies any fevers and chills.    The patient has since Infectious Disease and fashion with the rheumatologist and Infectious Disease that a left inguinal lymph node biopsy would be preferred.  This will be arranged with needle patient          Chief Complaint   Patient presents with    Hernia       Left inguinal          Review of patient's allergies indicates:  No Known Allergies     Current Medications          Current Outpatient Medications   Medication Sig Dispense Refill    methylPREDNISolone (MEDROL DOSEPACK) 4 mg tablet  use as directed 21 each 3    naproxen (NAPROSYN) 500 MG tablet Take 1 tablet (500 mg total) by mouth 2 (two) times daily. 30 tablet 1      No current facility-administered medications for this visit.                 Past Medical History:   Diagnosis Date    Allergy      Anxiety      Arthritis      Closed skull fracture with intracranial hemorrhage, with loss of consciousness       over 24 hour period    Neuromuscular disorder      Obstructive sleep apnea on CPAP      PTSD (post-traumatic stress disorder)              Past Surgical History:   Procedure Laterality Date    FRACTURE SURGERY         left femur    menicus repair        ROTATOR CUFF REPAIR         left      No family history on file.  Social History   Social History           Tobacco Use    Smoking status: Never    Smokeless tobacco: Never   Substance Use Topics    Alcohol use: No    Drug use: No            Review of Systems:  Review of Systems   Constitutional:  Negative for chills, fatigue, fever and unexpected weight change.   HENT:  Negative for congestion.    Eyes:  Negative for visual disturbance.   Respiratory:  Negative for shortness of breath.    Cardiovascular:  Negative for chest pain.   Gastrointestinal:  Negative for abdominal distention, abdominal pain, constipation, nausea, rectal pain and vomiting.   Genitourinary:  Negative for dysuria.   Musculoskeletal:  Positive for arthralgias and joint swelling.   Skin:  Negative for rash.   Neurological:  Negative for light-headedness.   Hematological:  Negative for adenopathy. Does not bruise/bleed easily.                  Objective  Vital Signs (Most Recent)  Vitals:    01/17/25 0729   BP: (!) 153/79   Pulse: (!) 57   Resp: 18   Temp: 98.1 °F (36.7 °C)        Physical Exam:  Physical Exam  Vitals reviewed.   Constitutional:       General: He is not in acute distress.     Appearance: He is well-developed. He is not toxic-appearing.   HENT:      Head: Normocephalic and atraumatic.      Right Ear:  External ear normal.      Left Ear: External ear normal.      Nose: Nose normal.      Mouth/Throat:      Mouth: Mucous membranes are moist.   Eyes:      Extraocular Movements: Extraocular movements intact.      Conjunctiva/sclera: Conjunctivae normal.   Cardiovascular:      Rate and Rhythm: Normal rate.   Pulmonary:      Effort: Pulmonary effort is normal. No respiratory distress.   Abdominal:      Comments: Hard to appreciate left inguinal hernia on examination.    Musculoskeletal:      Cervical back: Normal range of motion and neck supple.   Lymphadenopathy:      Head:      Right side of head: No submental, submandibular, tonsillar, preauricular, posterior auricular or occipital adenopathy.      Left side of head: No submental, submandibular, tonsillar, preauricular, posterior auricular or occipital adenopathy.      Cervical: No cervical adenopathy.      Right cervical: No superficial, deep or posterior cervical adenopathy.     Left cervical: No superficial, deep or posterior cervical adenopathy.      Upper Body:      Right upper body: No supraclavicular, axillary, pectoral or epitrochlear adenopathy.      Left upper body: No supraclavicular, axillary, pectoral or epitrochlear adenopathy.      Lower Body: No right inguinal adenopathy. No left inguinal adenopathy.      Comments: No palpable lymphadenopathy on examination   Skin:     General: Skin is warm and dry.   Neurological:      Mental Status: He is alert and oriented to person, place, and time.   Psychiatric:         Behavior: Behavior normal.               Diagnostic Results:  U/S groin left 09/2024  FINDINGS:  At site of patient's concern, 2 inguinal lymph nodes are seen with normal fatty asa and no pathologic akilah enlargement.  Nodes measure 2.1 x 0.7 x 1.7 cm and 4.6 x 0.5 x 0.7 cm.  No concerning mass, fluid collection, hernia, or other significant sonographic abnormality seen.        U/S groin left 12/2024:  FINDINGS: Sonographic evaluation of the  left inguinal region.  Grossly similar appearance of nonenlarged, benign-appearing left inguinal lymph nodes measuring 2.2 x 0.7 x 1.3 cm, previously 2.1 x 0.7 x 1.7 cm and 4.8 x 0.7 x 0.6 cm, previously 4.6 x 0.5 x 0.7 cm.     Just deep to the lymph nodes within the left inguinal region is the appearance of a small fat-containing hernia.  Hernia defect measures roughly 1.4 cm.        Impression:   Similar size of nonenlarged, benign-appearing left inguinal lymph nodes as above.     Small fat-containing left inguinal hernia.           Assessment and Plan  45 y/o male with left inguinal hernia and left inguinal lymph nodes previous enlarged with biopsy requested by rheumatology.     Robotic repair of a left inguinal hernia a right inguinal hernia if found  - discussed anatomy related to inguinal hernias in men.   - discussed robotic inguinal hernia repair risks and benefits including but not limited to pain, bleeding, infection, recurrence, seroma, hematoma, damage to surrounding structures including the vas deferens or testicular vessels, recurrence, need for further procedures to address these issues.  Patient voiced understanding.  The operative surgeon obtained informed consent.  -discussed risks and benefits of lymph node biopsy including all of the above in addition to lymphocele and insufficient/abnormal pathology results.  The patient voiced understanding.  Operative surgeon obtained informed consent.  -preop: CBC and CMP  -discussed postoperative hernia restrictions including up to 6 weeks of light duty to reduce the risk of hernia recurrence.  -return to clinic 2 weeks postoperatively.      The patient and biopsy of left inguinal lymph node.  Coma.  There is a small risk

## 2025-01-17 NOTE — DISCHARGE SUMMARY
[facility] (Hospital)  Discharge Note  Short Stay    US Guided Needle Placement      OUTCOME: Patient tolerated treatment/procedure well without complication and is now ready for discharge.    DISPOSITION: Home or Self Care    FINAL DIAGNOSIS:  <principal problem not specified>    FOLLOWUP: In clinic    DISCHARGE INSTRUCTIONS:  No discharge procedures on file.     TIME SPENT ON DISCHARGE: 15 minutes    Pre Op Diagnosis: left inguinal node     Post Op Diagnosis: same     Procedure:  wire localization     Procedure performed by: Leonides SNEED, Khoa PORTER     Written Informed Consent Obtained: Yes     Specimen Removed:  yes     Estimated Blood Loss:  minimal     Findings: Local anesthesia     Sedation:  no     The patient tolerated the procedure well and there were no complications.      Disposition:  F/U in clinic or with ordering physician    Discharge instructions:  Light activity for 24 hours.  Remove band aid in 24 hours.  No baths (showers are appropriate).      Sterile technique was performed in the left groin, lidocaine was used as a local anesthetic.  Node localized and patient to surgery.  Pt tolerated the procedure well without immediate complications.  Please see radiologist report for details. F/u with PCP and/or ordering physician.

## 2025-01-17 NOTE — ANESTHESIA PROCEDURE NOTES
Intubation    Date/Time: 1/17/2025 9:00 AM    Performed by: Marita Kenney CRNA  Authorized by: Jeovany Huddleston II, MD    Intubation:     Induction:  Intravenous    Intubated:  Postinduction    Mask Ventilation:  Easy mask    Attempts:  1    Attempted By:  CRNA    Method of Intubation:  Direct    Blade:  Rain 2    Laryngeal View Grade: Grade I - full view of cords      Difficult Airway Encountered?: No      Complications:  None    Airway Device:  Oral endotracheal tube    Airway Device Size:  7.5    Style/Cuff Inflation:  Cuffed (inflated to minimal occlusive pressure)    Tube secured:  22    Secured at:  The lips    Placement Verified By:  Capnometry    Complicating Factors:  None    Findings Post-Intubation:  BS equal bilateral and atraumatic/condition of teeth unchanged

## 2025-01-17 NOTE — DISCHARGE INSTRUCTIONS
Please call for any fever, increase in pain, nausea or vomiting or redness or drainage from incision(s).    No lifting more than 20 lb for 6 weeks    No driving if taking the pain medicine    May shower     Removed the glue when it becomes loose, this usually takes 10-14 days.      You may want to take a stool softener or Glycolax or MiraLax while taking pain medicine to avoid constipation    If you become constipated from the pain medication you can use a double dose of Miralax or Glycolax, or milk of magnesia for severe constipation.    Our office phone numbers are  [phone] and     Our office fax  number is #[phone]

## 2025-01-17 NOTE — OP NOTE
[facility] ([facility])  Operative Note      Date of Procedure: 1/17/2025     Procedure: Procedure(s) (LRB):  XI ROBOTIC REPAIR, HERNIA, INGUINAL, left explore right (Left)  BIOPSY, LYMPH NODE, INGUINAL (Left)     Surgeons and Role:     * Hugo Alcala MD - Primary    Assisting Surgeon: None    Pre-Operative Diagnosis: Lymph node enlargement [R59.9]  Left inguinal hernia [K40.90]    Post-Operative Diagnosis: Post-Op Diagnosis Codes:     * Lymph node enlargement [R59.9]     * Left inguinal hernia [K40.90]    Anesthesia: General    Operative Findings (including complications, if any):     1.  Relatively normal-appearing left inguinal lymph node.  The inguinal lymph node was sent for routine pathology, leukemia and lymphoma screen, and culture for aerobic bacteria, fungus, and acid-fast bacteria    A moderate-sized cord lipoma and a left inguinal hernia.  No evidence of a right inguinal hernia    Description of Technical Procedures:     Prior to the procedure the patient went for a left inguinal lymph node needle localization    The patient was brought into the operative room placed on the operative table in the supine position.  General endotracheal anesthesia was induced.  IV antibiotics were administered.  A Santos catheter was inserted.  Pneumatic compression devices on that has lower extremities.  The abdomen and groins were clipped, prepped and draped in the standard fashion.      A time-out was performed.      A diagonal incision was made extending from the guidewire medially in the left groin.  Subcutaneous tissues were dissected using electrocautery.  Serg's fascia was identified and opened.  A self-retaining retractor was inserted.  We followed the guidewire to a cluster of lymph nodes.  The cluster of lymph nodes was excised using electrocautery.  Hemostasis was achieved using electrocautery.      A small portion of the lymph node was sent for culture.  The remainder of the lymph node was sent for  pathologic analysis and lymphoma and leukemia screen.      The wound was irrigated.  Hemostasis was ensured.  The deep layers were closed with 3-0 Vicryl in an interrupted fashion.  The deep dermis with 3-0 Vicryl in interrupted fashion.  The skin was closed with 4-0 Monocryl in a subcuticular manner.      A small incision was made above the umbilicus.  The fascia was identified and elevated Kocher clamps.  A Veress needle was inserted and pneumoperitoneum established to 15 mmHg.  A 8 mm robotic trocar was inserted and there was no evidence of visceral or vascular injury.  Marcaine was infiltrated at the lateral trocar sites and right and left lateral abdominal wall trocars were placed.  Marcaine was infiltrated at the umbilical trocar site.      The patient was placed in Trendelenburg position docked the operating robot.  There was no vanessa evidence of a right or left inguinal hernia.      Peritoneum over the left inguinal area was scored.  The preperitoneal space was entered.  Dissection was carried out medially until the pubic tubercle was identified.  Dissection was carried out laterally until there was sufficient space for mesh placement.  Dissection was then carried out along the spermatic cord and a moderate size cord lipoma was reduced from the indirect space.  The cord lipoma was  from the spermatic cord.  The spermatic cord was then  from the peritoneum.      The hernia was repaired by in laying a 3 the max mid weight medium sized mesh.  The mesh was secured to Hung's ligament medially and the musculature laterally.  The peritoneum was elevated and the mesh remained in place.  The peritoneum was then closed with 0 barbed Monocryl suture in a running fashion.      Both needles were removed and accounted for.  The patient was undocked from the operating robot.  The trocars removed and no bleeding was noted.  The abdomen was desufflated.  The skin incisions were closed with 4-0 Monocryl in  a subcuticular manner.  Dermabond was placed at all incisions.      The testes were in the scrotum at the completion of the procedure.      Final counts were correct.      Patient tolerated procedure well    Significant Surgical Tasks Conducted by the Assistant(s), if Applicable:  None    Estimated Blood Loss (EBL):  15 mL   IV fluids 800 mL   Marcaine 0.25% 30 mL             Implants:   Implant Name Type Inv. Item Serial No.  Lot No. LRB No. Used Action   MESH 3DMAX ENRRIQUE LG LEFT 4X6IN - [id number]  MESH 3DMAX ENRRIQUE LG LEFT 4X6IN  [employer] [id number] Left 1 Implanted       Specimens:   Specimen (24h ago, onward)       Start     Ordered    01/17/25 1002  Specimen to Pathology, Surgery General Surgery  Once        Comments: Pre-op Diagnosis: Lymph node enlargement [R59.9]Left inguinal hernia [K40.90]Procedure(s):XI ROBOTIC REPAIR, HERNIA, INGUINAL, left explore rightBIOPSY, LYMPH NODE, INGUINAL Number of specimens: 1Name of specimens: 1. Left Inguinal Lymph Node - PERM     References:    Click here for ordering Quick Tip   Question Answer Comment   Procedure Type: General Surgery    Specimen Class: Routine/Screening    Release to patient Immediate        01/17/25 1003    01/17/25 0932  Specimen to Pathology, Surgery General Surgery  Once        Comments: Pre-op Diagnosis: Lymph node enlargement [R59.9]Left inguinal hernia [K40.90]Procedure(s):XI ROBOTIC REPAIR, HERNIA, INGUINAL, left explore rightBIOPSY, LYMPH NODE, INGUINAL Number of specimens: 1Name of specimens: 1. Left Inguinal Lymph Node - PERM (routine) and Lymphoma and Leukemia Screen     References:    Click here for ordering Quick Tip   Question Answer Comment   Procedure Type: General Surgery    Specimen Class: Routine/Screening    Release to patient Immediate        01/17/25 0939                            Condition: Stable    Disposition: PACU - hemodynamically stable.    Attestation: I performed the procedure.    Discharge Note    OUTCOME:  Patient tolerated treatment/procedure well without complication and is now ready for discharge.    Left inguinal lymph node biopsy    Robotic repair of a left inguinal hernia    DISPOSITION: Home or Self Care    FINAL DIAGNOSIS:  Left inguinal lymphadenopathy   Indirect left inguinal hernia containing a cord lipoma    FOLLOWUP: In clinic    DISCHARGE INSTRUCTIONS:    Discharge Procedure Orders   Diet general     Call MD for:  temperature >100.4     Call MD for:  persistent nausea and vomiting     Call MD for:  severe uncontrolled pain     Call MD for:  difficulty breathing, headache or visual disturbances     Call MD for:  redness, tenderness, or signs of infection (pain, swelling, redness, odor or green/yellow discharge around incision site)     Lifting restrictions   Order Comments: No lifting more than 20 lb for 6 weeks     No dressing needed        Clinical Reference Documents Added to Patient Instructions         Document    HERNIA REPAIR DISCHARGE INSTRUCTIONS (ENGLISH)    HYDROCODONE AND ACETAMINOPHEN, ADULT (ENGLISH)    LYMPH NODE BIOPSY (ENGLISH)    ONDANSETRON, ADULT (ENGLISH)

## 2025-01-17 NOTE — TRANSFER OF CARE
"Anesthesia Transfer of Care Note    Patient: Mickey Dominguez    Procedure(s) Performed: Procedure(s) (LRB):  XI ROBOTIC REPAIR, HERNIA, INGUINAL, left explore right (Left)  BIOPSY, LYMPH NODE, INGUINAL (Left)    Patient location: PACU    Anesthesia Type: general    Transport from OR: Transported from OR on room air with adequate spontaneous ventilation    Post pain: adequate analgesia    Post assessment: no apparent anesthetic complications    Post vital signs: stable    Level of consciousness: sedated    Nausea/Vomiting: no nausea/vomiting    Complications: none    Transfer of care protocol was followed      Last vitals: Visit Vitals  BP (!) 153/79 (BP Location: Right arm, Patient Position: Sitting)   Pulse (!) 57   Temp 36.7 °C (98.1 °F) (Temporal)   Resp 18   Ht 5' 11" (1.803 m)   Wt 91 kg (200 lb 9.9 oz)   SpO2 99%   BMI 27.98 kg/m²     "

## 2025-01-17 NOTE — ANESTHESIA POSTPROCEDURE EVALUATION
Anesthesia Post Evaluation    Patient: Mickey Dominguez    Procedure(s) Performed: Procedure(s) (LRB):  XI ROBOTIC REPAIR, HERNIA, INGUINAL, left explore right (Left)  BIOPSY, LYMPH NODE, INGUINAL (Left)    Final Anesthesia Type: general      Patient location during evaluation: PACU  Patient participation: Yes- Able to Participate  Level of consciousness: awake and alert  Post-procedure vital signs: reviewed and stable  Pain management: adequate  Airway patency: patent  CLAUDE mitigation strategies: Verification of full reversal of neuromuscular block  PONV status at discharge: No PONV  Anesthetic complications: no      Cardiovascular status: hemodynamically stable  Respiratory status: spontaneous ventilation  Hydration status: euvolemic  Follow-up not needed.              Vitals Value Taken Time   /79 01/17/25 1145   Temp 36.4 °C (97.5 °F) 01/17/25 1045   Pulse 98 01/17/25 1158   Resp 19 01/17/25 1146   SpO2 100 % 01/17/25 1158   Vitals shown include unfiled device data.      Event Time   Out of Recovery 11:50:54         Pain/Jj Score: Pain Rating Prior to Med Admin: 5 (1/17/2025 11:36 AM)  Jj Score: 10 (1/17/2025 11:52 AM)

## 2025-01-17 NOTE — ANESTHESIA PREPROCEDURE EVALUATION
01/17/2025  Mickey Dominguez is a 44 y.o., male.    Patient Active Problem List   Diagnosis    CLAUDE on CPAP    Psychophysiological insomnia    Arthralgia    Lymph node enlargement     Past Surgical History:   Procedure Laterality Date    FRACTURE SURGERY      left femur    menicus repair      ROTATOR CUFF REPAIR      left       Pre-op Assessment    I have reviewed the Patient Summary Reports.    I have reviewed the NPO Status.   I have reviewed the Medications.     Review of Systems  Anesthesia Hx:  No problems with previous Anesthesia                Social:  Non-Smoker       Hematology/Oncology:  Hematology Normal                                     Cardiovascular:  Cardiovascular Normal                                              Pulmonary:        Sleep Apnea, CPAP                Renal/:  Renal/ Normal                 Hepatic/GI:  Hepatic/GI Normal                    Musculoskeletal:  Arthritis               Neurological:  Neurology Normal          Hx Closed skull fracture with intracranial hemorrhage                            Endocrine:  Endocrine Normal            Psych:   anxiety   PTSD               Physical Exam  General: Well nourished    Airway:  Mallampati: II   Mouth Opening: Normal  TM Distance: Normal  Neck ROM: Normal ROM    Dental:  Intact        Anesthesia Plan  Type of Anesthesia, risks & benefits discussed:    Anesthesia Type: Gen ETT  Intra-op Monitoring Plan: Standard ASA Monitors  Post Op Pain Control Plan: multimodal analgesia  Induction:  IV  Airway Plan: , Post-Induction  Informed Consent: Informed consent signed with the Patient and all parties understand the risks and agree with anesthesia plan.  All questions answered.   ASA Score: 2    Ready For Surgery From Anesthesia Perspective.     .    Chemistry        Component Value Date/Time     12/16/2024 1026    K 4.7  12/16/2024 1026     12/16/2024 1026    CO2 28 12/16/2024 1026    BUN 16 12/16/2024 1026    CREATININE 0.9 12/16/2024 1026    GLU 99 12/16/2024 1026        Component Value Date/Time    CALCIUM 9.3 12/16/2024 1026    ALKPHOS 52 12/16/2024 1026    AST 26 12/16/2024 1026    ALT 28 12/16/2024 1026    BILITOT 0.4 12/16/2024 1026    ESTGFRAFRICA >60.0 05/28/2018 1555    EGFRNONAA >60.0 05/28/2018 1555        Lab Results   Component Value Date    WBC 7.99 12/16/2024    HGB 15.9 12/16/2024    HCT 48.2 12/16/2024    MCV 84 12/16/2024     12/16/2024

## 2025-01-21 LAB — BACTERIA SPEC AEROBE CULT: NO GROWTH

## 2025-01-24 ENCOUNTER — TELEPHONE (OUTPATIENT)
Dept: SURGERY | Facility: CLINIC | Age: 45
End: 2025-01-24
Payer: OTHER GOVERNMENT

## 2025-01-24 LAB
COMMENT: NORMAL
FINAL PATHOLOGIC DIAGNOSIS: NORMAL
FLOW CYTOMETRY ANTIBODIES ANALYZED - LYMPH NODE: NORMAL
FLOW CYTOMETRY COMMENT - LYMPH NODE: NORMAL
FLOW CYTOMETRY INTERPRETATION - LYMPH NODE: NORMAL
GROSS: NORMAL
Lab: NORMAL
MICROSCOPIC EXAM: NORMAL

## 2025-01-24 NOTE — TELEPHONE ENCOUNTER
Final Pathologic Diagnosis 1 & 2, Lymph nodes, left inguinal, excision:  -- Benign lymph nodesa with reactive follicular hyperplasia       Patient was called with the pathology results.      Cultures are still pending.      Flow cytometry    ymph Node Comment Flow cytometric analysis of left inguinal lymph node shows populations of polyclonal B lymphocytes and T lymphocytes with increased CD4 to CD8 ratio (8.5:1).  The blast gate is not increased.

## 2025-01-25 LAB
ACID FAST MOD KINY STN SPEC: NORMAL
MYCOBACTERIUM SPEC QL CULT: NORMAL

## 2025-01-27 ENCOUNTER — PATIENT MESSAGE (OUTPATIENT)
Dept: SURGERY | Facility: CLINIC | Age: 45
End: 2025-01-27
Payer: OTHER GOVERNMENT

## 2025-02-03 ENCOUNTER — OFFICE VISIT (OUTPATIENT)
Dept: SURGERY | Facility: CLINIC | Age: 45
End: 2025-02-03
Payer: OTHER GOVERNMENT

## 2025-02-03 VITALS
HEART RATE: 63 BPM | SYSTOLIC BLOOD PRESSURE: 127 MMHG | BODY MASS INDEX: 27.53 KG/M2 | WEIGHT: 196.63 LBS | DIASTOLIC BLOOD PRESSURE: 84 MMHG | HEIGHT: 71 IN

## 2025-02-03 DIAGNOSIS — R59.9 LYMPH NODE ENLARGEMENT: Primary | ICD-10-CM

## 2025-02-03 LAB
FUNGUS SPEC CULT: NORMAL
FUNGUS SPEC CULT: NORMAL

## 2025-02-03 PROCEDURE — 99024 POSTOP FOLLOW-UP VISIT: CPT | Mod: ,,, | Performed by: SURGERY

## 2025-02-03 PROCEDURE — 99999 PR PBB SHADOW E&M-EST. PATIENT-LVL III: CPT | Mod: PBBFAC,,, | Performed by: SURGERY

## 2025-02-03 PROCEDURE — 99213 OFFICE O/P EST LOW 20 MIN: CPT | Mod: PBBFAC | Performed by: SURGERY

## 2025-02-03 NOTE — PROGRESS NOTES
Subjective:       Patient ID: Mickey Dominguez is a 44 y.o. male.    Chief Complaint: Post-op Evaluation    Patient returns after robotic repair of a left inguinal hernia and left inguinal lymph node biopsy.  Had pain and swelling of the left groin but this is resolved.  The incision is healing as expected.  He has occasional twinges of abdominal pain.      Review of Systems   Gastrointestinal:         Incisions are healed   Genitourinary:         Left testicular pain and swelling or resolve       Objective:      Physical Exam  Constitutional:       Appearance: Normal appearance.   Abdominal:      Comments: Abdominal incisions are healing well.  Left inguinal incisions are healing well.    No evidence of a recurrent hernia   Neurological:      Mental Status: He is alert.           Final Pathologic Diagnosis 1 & 2, Lymph nodes, left inguinal, excision:  -- Benign lymph nodesa with reactive follicular hyperplasia (see comment).   Comment: Interp By Fran Guerrero M.D., Signed on 01/24/2025 at 15:08   Comment Flow cytometric analysis of left inguinal lymph node shows populations of polyclonal B lymphocytes and T lymphocytes with increased CD4 to CD8 ratio (8.5:1).  The blast gate is not increased. Flow differential:  Lymphocytes 94.4%, Monocytes 0.0%,  Granulocytes  0.6%, Blast  0.0%, Debris/nRBC 0.2%,  Viability 99.9%.    Immunohistochemical stains were performed with adequate controls for greater sensitivity and further architectural assessment.  The lymphoid follicles containing CD20-positive B-cells.  The germinal center B-cells are positive for BCL6 and negative for  BCL2 and display high proliferation index (Ki-67).  CD3-positive T-cells are seen in the paracortex and interfollicular areas.  The lymphocytes are negative for cyclin D1.    There is no evidence of lymphoproliferative disorder or other metastatic malignancies.  Correlation with clinical presentation and other laboratory results is required.      Assessment:    Recovering as expected after robotic repair of a left inguinal hernia with mesh.  Lymph node biopsy incision is healing well.  Lymph node was non pathologic    Plan:       No lifting more than 20 lb for another 4 weeks   Postoperative symptoms should continue to resolve.      Follow up with surgery as needed

## 2025-03-12 ENCOUNTER — PATIENT MESSAGE (OUTPATIENT)
Dept: SURGERY | Facility: HOSPITAL | Age: 45
End: 2025-03-12
Payer: OTHER GOVERNMENT

## 2025-03-13 ENCOUNTER — PATIENT MESSAGE (OUTPATIENT)
Dept: RHEUMATOLOGY | Facility: CLINIC | Age: 45
End: 2025-03-13
Payer: OTHER GOVERNMENT

## 2025-03-25 ENCOUNTER — PATIENT MESSAGE (OUTPATIENT)
Dept: SURGERY | Facility: HOSPITAL | Age: 45
End: 2025-03-25
Payer: OTHER GOVERNMENT

## 2025-03-31 ENCOUNTER — TELEPHONE (OUTPATIENT)
Dept: RHEUMATOLOGY | Facility: CLINIC | Age: 45
End: 2025-03-31
Payer: OTHER GOVERNMENT

## 2025-03-31 NOTE — TELEPHONE ENCOUNTER
Called patient to inform them that  is leaving and they would need to find new rheumatologist. Told patient to call us back with a preferred provider so we can transfer referrals.

## 2025-05-09 ENCOUNTER — OFFICE VISIT (OUTPATIENT)
Dept: FAMILY MEDICINE | Facility: CLINIC | Age: 45
End: 2025-05-09
Payer: OTHER GOVERNMENT

## 2025-05-09 ENCOUNTER — LAB VISIT (OUTPATIENT)
Dept: LAB | Facility: HOSPITAL | Age: 45
End: 2025-05-09
Attending: STUDENT IN AN ORGANIZED HEALTH CARE EDUCATION/TRAINING PROGRAM
Payer: OTHER GOVERNMENT

## 2025-05-09 VITALS
SYSTOLIC BLOOD PRESSURE: 121 MMHG | HEIGHT: 71 IN | BODY MASS INDEX: 28.44 KG/M2 | DIASTOLIC BLOOD PRESSURE: 75 MMHG | HEART RATE: 57 BPM | TEMPERATURE: 97 F | WEIGHT: 203.13 LBS | OXYGEN SATURATION: 98 %

## 2025-05-09 DIAGNOSIS — M25.50 ARTHRALGIA, UNSPECIFIED JOINT: ICD-10-CM

## 2025-05-09 DIAGNOSIS — G47.33 OSA ON CPAP: ICD-10-CM

## 2025-05-09 DIAGNOSIS — L98.9 SKIN LESION: Primary | ICD-10-CM

## 2025-05-09 LAB
ABSOLUTE EOSINOPHIL (OHS): 0.07 K/UL
ABSOLUTE MONOCYTE (OHS): 0.5 K/UL (ref 0.3–1)
ABSOLUTE NEUTROPHIL COUNT (OHS): 4.93 K/UL (ref 1.8–7.7)
BASOPHILS # BLD AUTO: 0.03 K/UL
BASOPHILS NFR BLD AUTO: 0.3 %
CRP SERPL-MCNC: 2.3 MG/L
ERYTHROCYTE [DISTWIDTH] IN BLOOD BY AUTOMATED COUNT: 12.2 % (ref 11.5–14.5)
ERYTHROCYTE [SEDIMENTATION RATE] IN BLOOD BY PHOTOMETRIC METHOD: 5 MM/HR
HCT VFR BLD AUTO: 44 % (ref 40–54)
HGB BLD-MCNC: 14.8 GM/DL (ref 14–18)
IMM GRANULOCYTES # BLD AUTO: 0.1 K/UL (ref 0–0.04)
IMM GRANULOCYTES NFR BLD AUTO: 1.1 % (ref 0–0.5)
LYMPHOCYTES # BLD AUTO: 3.12 K/UL (ref 1–4.8)
MCH RBC QN AUTO: 27.6 PG (ref 27–31)
MCHC RBC AUTO-ENTMCNC: 33.6 G/DL (ref 32–36)
MCV RBC AUTO: 82 FL (ref 82–98)
NUCLEATED RBC (/100WBC) (OHS): 0 /100 WBC
PLATELET # BLD AUTO: 173 K/UL (ref 150–450)
PMV BLD AUTO: 11.5 FL (ref 9.2–12.9)
RBC # BLD AUTO: 5.36 M/UL (ref 4.6–6.2)
RELATIVE EOSINOPHIL (OHS): 0.8 %
RELATIVE LYMPHOCYTE (OHS): 35.7 % (ref 18–48)
RELATIVE MONOCYTE (OHS): 5.7 % (ref 4–15)
RELATIVE NEUTROPHIL (OHS): 56.4 % (ref 38–73)
WBC # BLD AUTO: 8.75 K/UL (ref 3.9–12.7)

## 2025-05-09 PROCEDURE — 86140 C-REACTIVE PROTEIN: CPT

## 2025-05-09 PROCEDURE — 85025 COMPLETE CBC W/AUTO DIFF WBC: CPT

## 2025-05-09 PROCEDURE — 99999 PR PBB SHADOW E&M-EST. PATIENT-LVL IV: CPT | Mod: PBBFAC,,, | Performed by: STUDENT IN AN ORGANIZED HEALTH CARE EDUCATION/TRAINING PROGRAM

## 2025-05-09 PROCEDURE — 99214 OFFICE O/P EST MOD 30 MIN: CPT | Mod: PBBFAC,PN | Performed by: STUDENT IN AN ORGANIZED HEALTH CARE EDUCATION/TRAINING PROGRAM

## 2025-05-09 PROCEDURE — 85652 RBC SED RATE AUTOMATED: CPT

## 2025-05-09 PROCEDURE — 36415 COLL VENOUS BLD VENIPUNCTURE: CPT | Mod: PN

## 2025-05-09 RX ORDER — METHYLPREDNISOLONE 4 MG/1
TABLET ORAL
Qty: 1 EACH | Refills: 0 | Status: SHIPPED | OUTPATIENT
Start: 2025-05-09

## 2025-05-09 NOTE — PROGRESS NOTES
"Patient ID: Mickey Dominguez is a 44 y.o. male.    Chief Complaint: Follow-up (Joint swelling and pain. Rash) and Rash    History of Present Illness    CHIEF COMPLAINT:  Mr. Dominguez presents today for follow up of multiple ongoing issues including post-surgical pain and skin lesions    POST-SURGICAL SYMPTOMS:  He describes sharp, intermittent pain on his side. He notes persistent swelling on his left testicle in the vas deferens area. The swelling was previously more pronounced with significant enlargement but has since decreased in size.    DERMATOLOGIC:  He reports skin lesions affecting the neck, thigh, scalp, and foot for approximately 2 years. The lesions are characterized by intense pruritus, particularly in the head and neck region. Topical cortisone treatment resulted in symptom exacerbation. Time is reported as the only factor providing relief.    MUSCULOSKELETAL:  He reports joint pain (4/10 severity) in elbows, shoulders, lower back, hips, and feet, worst in the mornings. He denies significant knee pain. He experiences bilateral foot pain and swelling for 10 years, worse in the mornings with difficulty bearing weight and limping. He describes a burning "fire pain" currently in one foot but historically affecting both feet. He denies fungal infections or athlete's foot.    PAST MEDICAL HISTORY:  History includes latent TB, sleep apnea requiring CPAP (machine currently non-serviceable), and recent left inguinal lymph node removal with benign pathology.      ROS:  General: -fever, -chills, -fatigue, -weight gain, -weight loss  Eyes: -vision changes, -redness, -discharge  ENT: -ear pain, -nasal congestion, -sore throat  Cardiovascular: -chest pain, -palpitations, +lower extremity edema  Respiratory: -cough, -shortness of breath  Gastrointestinal: -abdominal pain, -nausea, -vomiting, -diarrhea, -constipation, -blood in stool  Genitourinary: -dysuria, -hematuria, -frequency  Musculoskeletal: +joint pain, " -muscle pain, +limb pain, +joint stiffness, +back pain, +burning sensation  Skin: -rash, +lesion, +sores, +itching  Neurological: -headache, -dizziness, -numbness, -tingling  Psychiatric: -anxiety, -depression, -sleep difficulty  Male Genitourinary: +testicular swelling         Pmh, Psh, Family Hx, Social Hx updated in Epic Tabs today.       5/9/2025     2:19 PM 7/15/2024     7:50 AM   Depression Patient Health Questionnaire   Over the last two weeks how often have you been bothered by little interest or pleasure in doing things Not at all Not at all   Over the last two weeks how often have you been bothered by feeling down, depressed or hopeless Not at all Not at all   PHQ-2 Total Score 0 0       Active Problem List with Overview Notes    Diagnosis Date Noted    Arthralgia 07/15/2024    Lymph node enlargement 07/15/2024    CLAUDE on CPAP 07/09/2018    Psychophysiological insomnia 07/09/2018       Past Medical History:   Diagnosis Date    Allergy     Anxiety     Arthritis     Closed skull fracture with intracranial hemorrhage, with loss of consciousness     over 24 hour period    Neuromuscular disorder     Obstructive sleep apnea on CPAP     PTSD (post-traumatic stress disorder)        Past Surgical History:   Procedure Laterality Date    BIOPSY OF INGUINAL LYMPH NODE Left 1/17/2025    Procedure: BIOPSY, LYMPH NODE, INGUINAL;  Surgeon: Hugo Alcala MD;  Location: [facility] OR;  Service: General;  Laterality: Left;    FRACTURE SURGERY      left femur    menicus repair      ROBOT-ASSISTED LAPAROSCOPIC REPAIR OF INGUINAL HERNIA USING DA YARA XI Left 1/17/2025    Procedure: XI ROBOTIC REPAIR, HERNIA, INGUINAL;  Surgeon: Hugo Alcala MD;  Location: [facility] OR;  Service: General;  Laterality: Left;    ROTATOR CUFF REPAIR      left       No family history on file.    Social History     Socioeconomic History    Marital status:    Tobacco Use    Smoking status: Never    Smokeless tobacco: Never   Substance and  Sexual Activity    Alcohol use: No    Drug use: No    Sexual activity: Yes     Partners: Female     Social Drivers of Health     Financial Resource Strain: Low Risk  (5/9/2025)    Overall Financial Resource Strain (CARDIA)     Difficulty of Paying Living Expenses: Not hard at all   Food Insecurity: No Food Insecurity (5/9/2025)    Hunger Vital Sign     Worried About Running Out of Food in the Last Year: Never true     Ran Out of Food in the Last Year: Never true   Transportation Needs: No Transportation Needs (5/9/2025)    PRAPARE - Transportation     Lack of Transportation (Medical): No     Lack of Transportation (Non-Medical): No   Physical Activity: Sufficiently Active (5/9/2025)    Exercise Vital Sign     Days of Exercise per Week: 4 days     Minutes of Exercise per Session: 50 min   Stress: Stress Concern Present (5/9/2025)    [employer] - Occupational Stress Questionnaire     Feeling of Stress : To some extent   Housing Stability: Low Risk  (5/9/2025)    Housing Stability Vital Sign     Unable to Pay for Housing in the Last Year: No     Homeless in the Last Year: No       Medications Ordered Prior to Encounter[1]    Review of patient's allergies indicates:  No Known Allergies      Review of Systems        Physical Exam  HENT:      Head: Normocephalic.      Mouth/Throat:      Mouth: Mucous membranes are moist.   Eyes:      Extraocular Movements: Extraocular movements intact.      Pupils: Pupils are equal, round, and reactive to light.   Cardiovascular:      Rate and Rhythm: Normal rate and regular rhythm.      Pulses: Normal pulses.      Heart sounds: Normal heart sounds.   Pulmonary:      Effort: Pulmonary effort is normal.      Breath sounds: Normal breath sounds.   Abdominal:      Palpations: Abdomen is soft.   Musculoskeletal:      Right lower leg: No edema.      Left lower leg: No edema.   Skin:     Capillary Refill: Capillary refill takes less than 2 seconds.      Findings:  Rash present.      Comments: Well-demarcated erythematous plaque with silvery white scale   Neurological:      General: No focal deficit present.      Mental Status: He is alert and oriented to person, place, and time.      Assessment:     1. Skin lesion    2. Arthralgia, unspecified joint    3. CLAUDE on CPAP        LABS:   Lab Results   Component Value Date    HGBA1C 5.3 07/01/2024      Lab Results   Component Value Date    CHOL 156 07/01/2024     Lab Results   Component Value Date    LDLCALC 94.6 07/01/2024     Lab Results   Component Value Date    WBC 7.99 12/16/2024    HGB 15.9 12/16/2024    HCT 48.2 12/16/2024     12/16/2024    CHOL 156 07/01/2024    TRIG 147 07/01/2024    HDL 32 (L) 07/01/2024    ALT 28 12/16/2024    AST 26 12/16/2024     12/16/2024    K 4.7 12/16/2024     12/16/2024    CREATININE 0.9 12/16/2024    BUN 16 12/16/2024    CO2 28 12/16/2024    TSH 2.344 07/01/2024    HGBA1C 5.3 07/01/2024       Plan:   Mickey was seen today for follow-up and rash.    Diagnoses and all orders for this visit:    Skin lesion    Arthralgia, unspecified joint  -     CBC Auto Differential; Future  -     Sedimentation rate; Future  -     C-Reactive Protein; Future  -     C-Reactive Protein; Future  -     methylPREDNISolone (MEDROL DOSEPACK) 4 mg tablet; use as directed    CLAUDE on CPAP  -     Ambulatory referral/consult to Sleep Disorders; Future        Assessment & Plan    CHRONIC POSTPROCEDURAL PAIN:  - Mr. Dominguez reports ongoing sharp, random side pain following surgery.  - Evaluated pain characteristics.    Skin lesion   - Mr. Dominguez reports itchy lesions that flare with cortisone and resolve over time.  - Lesions appear red, itchy, and sometimes as little red dots.  - Based on exam, lesions are consistent with psoriasis, but definitive diagnosis requires confirmation.  - Ordered shave biopsy of skin lesions to determine etiology.  - Follow-up scheduled Monday for skin biopsy.    PRURITUS:  - Mr. Dominguez  experiences intense itching, especially on the medial side of the foot and other areas associated with lesions.  - Prescribed Medrol Dosepak to help with itching and joint pain.    SHOULDER PAIN:  - Evaluated shoulder pain as part of patient's overall joint pain symptoms.    ELBOW PAIN:  - Evaluated elbow pain as part of patient's overall joint pain symptoms.    LOW BACK PAIN:  - Evaluated lower back pain as part of patient's overall joint pain symptoms.    HIP PAIN:  - Evaluated hip pain as part of patient's overall joint pain symptoms.    RIGHT ANKLE EFFUSION:  - Mr. Dominguez reports swelling and pain in the right foot, described as a stone bruise and itchy.  - Symptoms resemble plantar fasciitis, though autoimmune etiology has not been ruled out.    JOINT PAIN MANAGEMENT:  - Started Medrol Dosepak to address joint pain and evaluate response to steroids.  - Ordered labs including CBC, CMP, and inflammatory markers to assess current inflammation levels and guide treatment.    LATENT TUBERCULOSIS:  - Mr. Dominguez has latent TB confirmed by infectious disease specialist with clear chest XR and no symptoms present.  - Instructed to follow up with infectious disease for latent TB treatment.    OBSTRUCTIVE SLEEP APNEA:  - Mr. Dominguez reports running out of supplies for sleep apnea treatment.  - Referred to sleep specialist for management and supplies.    FOLLOW-UP:  - Contact the office if symptoms worsen or new concerns arise.           Face to Face time with patient:  2:20 PM CDT -      Each patient to whom he or she provides medical services by telemedicine is:  (1) informed of the relationship between the physician and patient and the respective role of any other health care provider with respect to management of the patient; and (2) notified that he or she may decline to receive medical services by telemedicine and may withdraw from such care at any time.    I spent a total of   32    minutes face to face and non-face to  face on the date of this visit.This includes time preparing to see the patient (eg, review of tests, notes), obtaining and/or reviewing additional history from an independent historian and/or outside medical records, documenting clinical information in the electronic health record, independently interpreting results and/or communicating results to the patient/family/caregiver, or care coordinator.  Visit today included increased complexity associated with the care of the episodic problem addressed and managing the longitudinal care of the patient due to the serious and/or complex managed problem(s).    There are no Patient Instructions on file for this visit.    No follow-ups on file.  The 10-year ASCVD risk score (Mac VAZQUEZ, et al., 2019) is: 1.9%    Values used to calculate the score:      Age: 44 years      Sex: Male      Is Non- : No      Diabetic: No      Tobacco smoker: No      Systolic Blood Pressure: 121 mmHg      Is BP treated: No      HDL Cholesterol: 32 mg/dL      Total Cholesterol: 156 mg/dL    This note was generated with the assistance of ambient listening technology. Verbal consent was obtained by the patient and accompanying visitor(s) for the recording of patient appointment to facilitate this note. I attest to having reviewed and edited the generated note for accuracy, though some syntax or spelling errors may persist. Please contact the author of this note for any clarification.         [1]   No current outpatient medications on file prior to visit.     No current facility-administered medications on file prior to visit.

## 2025-05-12 ENCOUNTER — OFFICE VISIT (OUTPATIENT)
Dept: FAMILY MEDICINE | Facility: CLINIC | Age: 45
End: 2025-05-12
Payer: OTHER GOVERNMENT

## 2025-05-12 VITALS
HEART RATE: 75 BPM | HEIGHT: 71 IN | BODY MASS INDEX: 27.58 KG/M2 | SYSTOLIC BLOOD PRESSURE: 138 MMHG | WEIGHT: 197 LBS | OXYGEN SATURATION: 97 % | DIASTOLIC BLOOD PRESSURE: 86 MMHG

## 2025-05-12 DIAGNOSIS — L98.9 SKIN LESION: ICD-10-CM

## 2025-05-12 DIAGNOSIS — L98.9 DISEASE OF SKIN AND SUBCUTANEOUS TISSUE: Primary | ICD-10-CM

## 2025-05-12 PROCEDURE — 11104 PUNCH BX SKIN SINGLE LESION: CPT | Mod: S$PBB,,, | Performed by: STUDENT IN AN ORGANIZED HEALTH CARE EDUCATION/TRAINING PROGRAM

## 2025-05-12 PROCEDURE — 99499 UNLISTED E&M SERVICE: CPT | Mod: S$PBB,,, | Performed by: STUDENT IN AN ORGANIZED HEALTH CARE EDUCATION/TRAINING PROGRAM

## 2025-05-12 PROCEDURE — 99213 OFFICE O/P EST LOW 20 MIN: CPT | Mod: PBBFAC,PN | Performed by: STUDENT IN AN ORGANIZED HEALTH CARE EDUCATION/TRAINING PROGRAM

## 2025-05-12 PROCEDURE — 99999 PR PBB SHADOW E&M-EST. PATIENT-LVL III: CPT | Mod: PBBFAC,,, | Performed by: STUDENT IN AN ORGANIZED HEALTH CARE EDUCATION/TRAINING PROGRAM

## 2025-05-12 PROCEDURE — 11104 PUNCH BX SKIN SINGLE LESION: CPT | Mod: PBBFAC,PN | Performed by: STUDENT IN AN ORGANIZED HEALTH CARE EDUCATION/TRAINING PROGRAM

## 2025-05-12 NOTE — PROGRESS NOTES
Punch Biopsy Procedure Note    Pre-operative Diagnosis: skin lesion    Post-operative Diagnosis: same    Locations: R arm    Anesthesia: Lidocaine 1% without epinephrine without added sodium bicarbonate    Procedure Details   History of allergy to iodine: no  Patient informed of the risks (including bleeding and infection) and benefits of the   procedure and Written informed consent obtained.    The lesion and surrounding area was given a sterile prep using chlorhexidine and draped in the usual sterile fashion. The skin was then stretched perpendicular to the skin tension lines and the lesion removed using the 4mm punch.  Used pressure to stop bleeding . Antibiotic ointment and a sterile dressing applied. The specimen was sent for pathologic examination. The patient tolerated the procedure well.    EBL: <2 ml        Condition:  Stable    Complications:  none.    Plan:  1. Instructed to keep the wound dry and covered for 24-48h and clean thereafter.  2. Warning signs of infection were reviewed.    3. Recommended that the patient use OTC acetaminophen as needed for pain.       Sabrina Reese MD  Family Medicine

## 2025-05-13 ENCOUNTER — OFFICE VISIT (OUTPATIENT)
Dept: INFECTIOUS DISEASES | Facility: CLINIC | Age: 45
End: 2025-05-13
Payer: OTHER GOVERNMENT

## 2025-05-13 ENCOUNTER — PATIENT MESSAGE (OUTPATIENT)
Dept: PULMONOLOGY | Facility: CLINIC | Age: 45
End: 2025-05-13
Payer: OTHER GOVERNMENT

## 2025-05-13 VITALS
HEART RATE: 62 BPM | SYSTOLIC BLOOD PRESSURE: 140 MMHG | BODY MASS INDEX: 27.59 KG/M2 | DIASTOLIC BLOOD PRESSURE: 80 MMHG | WEIGHT: 197.06 LBS | HEIGHT: 71 IN

## 2025-05-13 DIAGNOSIS — R59.1 LAD (LYMPHADENOPATHY): ICD-10-CM

## 2025-05-13 DIAGNOSIS — R76.12 POSITIVE QUANTIFERON-TB GOLD TEST: Primary | ICD-10-CM

## 2025-05-13 DIAGNOSIS — Z98.890 H/O INGUINAL HERNIA REPAIR: ICD-10-CM

## 2025-05-13 DIAGNOSIS — Z87.19 H/O INGUINAL HERNIA REPAIR: ICD-10-CM

## 2025-05-13 PROCEDURE — 99214 OFFICE O/P EST MOD 30 MIN: CPT | Mod: S$PBB,,, | Performed by: STUDENT IN AN ORGANIZED HEALTH CARE EDUCATION/TRAINING PROGRAM

## 2025-05-13 PROCEDURE — 99213 OFFICE O/P EST LOW 20 MIN: CPT | Mod: PBBFAC | Performed by: STUDENT IN AN ORGANIZED HEALTH CARE EDUCATION/TRAINING PROGRAM

## 2025-05-13 PROCEDURE — 99999 PR PBB SHADOW E&M-EST. PATIENT-LVL III: CPT | Mod: PBBFAC,,, | Performed by: STUDENT IN AN ORGANIZED HEALTH CARE EDUCATION/TRAINING PROGRAM

## 2025-05-13 RX ORDER — RIFAMPIN 300 MG/1
600 CAPSULE ORAL DAILY
Qty: 60 CAPSULE | Refills: 3 | Status: SHIPPED | OUTPATIENT
Start: 2025-05-13 | End: 2025-09-10

## 2025-05-13 NOTE — PROGRESS NOTES
Infectious Disease Clinic Note    Patient Name: Mickey Dominguez  YOB: 1980    PRESENTING HISTORY       History of Present Illness:  Mr. Mickey Dominguez is a 44 y.o. male w/ significant PMHx of CLAUDE and arthritis referred by rheumatology for positive quant gold and inguinal lymphadenopathy (LAD). Referred to general surgery for hernia repair and possible lymph node biopsy. Plan is to repair hernia robotically. With regard to TB risk factors: +international travel ([place], [place]) and is active , negative for HIV, IVDU, incarceration, or homelessness. Currently without respiratory symptoms. CXR in November was negative. Has been having a lot of arthritis and joint aches along with night sweats. Initially high fever but no longer. Swelling aside from feet is recent. Syphilis testing and HIV negative. Patient also mentions exposure to livestock. Discussed case with surgical team. Will plan for lymph node biopsy given travel history and positive quant gold. Will also order Karius today and check additional serologies given constellation of symptoms and exposure risks. If both biopsy and Karius negative will plan to begin latent TB therapy with rifampin x 4 months. Patient voiced understanding.      5/13: Karius and inguinal biopsy negative for infection. Spotted fever testing positive. Completed short course of doxycycline. Discussed starting latent TB therapy using rifampin. Patient agreeable. CT c/a/p in January was unrevealing. Will need monthly CBC and CMP while on treatment. Follow skin biopsy. Needs to establish care with new rheumatologist. Continues to have sporadic joint aches accompanied by small skin lesions.           Spotted Fever Group Antibody, IgG, Serum 1:64 Abnormal    Comment: Single IgG serum endpoint titers of 1:64 or 1:128 are  suggestive of infection at an undetermined time and may be  indicative of either past infection or early response to  a  recent infection. The best serologic evidence of recent  rickettsial infection is a four-fold or greater increase in  titer between two serum samples drawn 1-2 weeks apart and  tested in parallel.   Spotted Fever Group Antibody, IgM, Serum <1:64   Comment: No antibody detected.    Test Performed by:  [facility]  [address] [place] [address]  : Kameron Cunha Ph.D.; CLIA# [id number]   Resulting Agency [place]                Narrative  Performed by: [place]  Send normal result to authorizing provider's In Basket if  patient is active on MyChart:->Yes   Specimen Collected: 01/08/25 17:05 CST Last Resulted: 01/13/25 06:42 CST           EXAMINATION:  CT CHEST ABDOMEN PELVIS WITH IV CONTRAST (XPD)     CLINICAL HISTORY:  rule out pulmonary TB, mass, abscess;Enlarged lymph nodes, unspecified     TECHNIQUE:  Low-dose CT of the chest abdomen and pelvis with contrast was acquired helically from the lung apices through the ischial tuberosities status post administration of 100 cc of Omnipaque 350. Oral contrast was administered.  Axial and reformatted images were reviewed.     COMPARISON:  None     FINDINGS:  CHEST     No infiltrates or pleural effusions are identified.  No discrete pulmonary nodules or masses are identified.  There is a calcified granuloma noted within the right middle lobe adjacent to the major fissure.     The aorta is unremarkable in appearance. There is no pericardial effusion.  No enlarged mediastinal, hilar or axillary lymph nodes are identified.     ABDOMEN     Liver/gallbladder/biliary: The liver demonstrates no focal abnormality. The gallbladder is present and unremarkable.  No biliary ductal dilation.     Pancreas: The pancreas is unremarkable in appearance.     Spleen: The spleen is not enlarged.     Adrenals: Unremarkable     Kidneys: The kidneys are equally perfused and demonstrate no solid masses.     Bowel/Mesentery: There is no  "evidence of bowel obstruction. Normal appendix noted.  No mesenteric stranding or adenopathy.     Retroperitoneum: No adenopathy.  The aorta demonstrates a normal caliber.     PELVIS     Genitourinary/Reproductive organs: Unremarkable     Adenopathy: None     Free Fluid: No free fluid     Osseus Structures/Soft tissues: No suspicious appearing osseous abnormalities.     Impression:     1. No acute pathology noted within the chest, abdomen or pelvis.        Electronically signed by:Foster Juarez DO  Date:                                            01/15/2025  Time:                                           10:35        Exam Ended: 01/15/25 10:29 CST Last Resulted: 01/15/25 10:35 CST         Last rheumatology note reviewed: "For the past 10 years he's been getting flare ups of joint pain with swelling. Initially affecting low back, feet, toes, ankles with erythema and swelling in the distal joints and soft tissue, sometimes will tiny pustules over the heels. He showed me a photo from his phone of swollen and red toes during a flare up. Then flares started involving elbows, wrists, MCPs, PIPs. He feels widespread pain during flares, like he's 89 years old he says. Gets blurry vision during flares. Flare ups might happen twice a year or 8 times a year. Flare ups used to last a few days. However a few months ago he had a flare lasting 10 weeks. Denies fever during flare ups. Prednisone seems to help. Between flare ups he feels great. 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 He's had exposure to harmful chemicals and infections. 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 He reports MRI in the  showed spondylosis in the spine. Various infection tests have been negative so far. RF, CCP, MARIE are negative. ESR was elevated recently even after a course of prednisone for a recent flare.      Symptoms of intermittent widespread " "arthralgias and generalized joint or soft tissue swelling. Differential for the flare ups includes connective tissue disease, inflammatory arthritis, seronegative spondyloarthritis, reactive arthritis, infectious arthritis, among others. We checked labs here and inflammatory markers returned to normal (not flaring at the time). Quantiferon is positive. Rheumatology initial workup labs are unrevealing so will do further lab testing today. XR negative except for lumbar degenerative disease. CXR normal. He reports having negative PPD 2 years ago. Has been deployed in the past 2 years.      Plan:  ?Labs for scleroderma, myositis, vasculitis.  ?Referral to ID for positive Quantiferon and for any possible underlying infection which might cause his symptoms.  ?If TB is treated and any active infection causing the symptoms is ruled out, we can consider treating empirically for seronegative RA or SpA."      General surgery note reviewed: "Patient is a 43 y.o. male who presents for evaluation of left inguinal lymphadenopathy noted on recent U/S. He is in the  and was participating in a fitness test last December when he felt like he injured his left groin. He initially felt like this was a muscle strain, but the pain persisted in the area for several months.  Since then, he has had onset of significant joint pain in several joints throughout his body associated with swelling of his extremities and a discoid rash at times.  He is undergoing rheumatologic/autoimmune workup with a his PCP and has been referred to Rheumatology.  He underwent ultrasound of his left groin in July without evidence of hernia but with some prominent lymph nodes as below. Denies any family or personal history of cancer. Denies any unexpected weight loss, fevers, chills, night sweats, nausea, or vomiting. Denies any recent infections in the groin area, including STIs.      45 y/o male with left inguinal hernia and left inguinal lymph nodes " "previous enlarged with biopsy requested by rheumatology.     - discussed anatomy related to inguinal hernias in men.   - discussed robotic inguinal hernia repair risks and benefits including but not limited to pain, bleeding, infection, recurrence, seroma, hematoma, damage to surrounding structures including the vas deferens or testicular vessels, recurrence, need for further procedures to address these issues.  Patient voiced understanding.  The operative surgeon obtained informed consent.  -discussed risks and benefits of lymph node biopsy including all of the above in addition to lymphocele and insufficient/abnormal pathology results.  The patient voiced understanding.  Operative surgeon obtained informed consent."      EXAM: US SOFT TISSUE, GROIN LEFT     CLINICAL HISTORY: Interval evaluation of lymph nodes; enlarged lymph nodes, unspecified     COMPARISON: Head and neck ultrasound 07/01/2024     FINDINGS: Sonographic evaluation of the left inguinal region.  Grossly similar appearance of nonenlarged, benign-appearing left inguinal lymph nodes measuring 2.2 x 0.7 x 1.3 cm, previously 2.1 x 0.7 x 1.7 cm and 4.8 x 0.7 x 0.6 cm, previously 4.6 x 0.5 x 0.7 cm.     Just deep to the lymph nodes within the left inguinal region is the appearance of a small fat-containing hernia.  Hernia defect measures roughly 1.4 cm.        Impression:   Similar size of nonenlarged, benign-appearing left inguinal lymph nodes as above.     Small fat-containing left inguinal hernia.     Finalized on: 12/11/2024 3:50 PM By:  Aquilino Edwards MD  [facility]# [id number]      2024-12-11 15:52:16.893    [facility]              Exam Ended: 12/11/24 09:54 CST Last Resulted: 12/11/24 15:50 CST       Final Pathologic Diagnosis 1 & 2, Lymph nodes, left inguinal, excision:  -- Benign lymph nodesa with reactive follicular hyperplasia (see comment).   Comment: Interp By Fran Guerrero M.D., Signed on 01/24/2025 at 15:08   Comment Flow cytometric analysis of left " inguinal lymph node shows populations of polyclonal B lymphocytes and T lymphocytes with increased CD4 to CD8 ratio (8.5:1).  The blast gate is not increased. Flow differential:  Lymphocytes 94.4%, Monocytes 0.0%,  Granulocytes  0.6%, Blast  0.0%, Debris/nRBC 0.2%,  Viability 99.9%.    Immunohistochemical stains were performed with adequate controls for greater sensitivity and further architectural assessment.  The lymphoid follicles containing CD20-positive B-cells.  The germinal center B-cells are positive for BCL6 and negative for  BCL2 and display high proliferation index (Ki-67).  CD3-positive T-cells are seen in the paracortex and interfollicular areas.  The lymphocytes are negative for cyclin D1.    There is no evidence of lymphoproliferative disorder or other metastatic malignancies.  Correlation with clinical presentation and other laboratory results is required.   Microscopic Exam Histologic sections of the specimen show lymph node with well-preserved architecture.  Both primary and secondary lymphoid follicles are seen.  The reactive lymphoid follicles show well-formed germinal centers.  The lymphocytes are morphologically  unremarkable.  Sinuses are patent.  Other abnormal infiltrates are not identified.         Review of Systems:  Constitutional: no fever or chills  Eyes: no visual changes  ENT: no nasal congestion or sore throat  Respiratory: no cough or shortness of breath  Cardiovascular: no chest pain  Gastrointestinal: no nausea or vomiting, no abdominal pain, no constipation, no diarrhea  Genitourinary: no hematuria or dysuria  Musculoskeletal: +intermittent arthralgias   Skin: +skin lesions, predominantly on arms; s/p biopsy   Neurological: no headaches, numbness, or paresthesias    The following portions of the patient's history were reviewed and updated as appropriate: allergies, current medications, past family history, past medical history, past social history, past surgical history, and  problem list.    PAST HISTORY:       There is no immunization history on file for this patient.    Past Medical History:   Diagnosis Date    Allergy     Anxiety     Arthritis     Closed skull fracture with intracranial hemorrhage, with loss of consciousness     over 24 hour period    Neuromuscular disorder     Obstructive sleep apnea on CPAP     PTSD (post-traumatic stress disorder)        Past Surgical History:   Procedure Laterality Date    BIOPSY OF INGUINAL LYMPH NODE Left 1/17/2025    Procedure: BIOPSY, LYMPH NODE, INGUINAL;  Surgeon: Hugo Alcala MD;  Location: [facility] OR;  Service: General;  Laterality: Left;    FRACTURE SURGERY      left femur    menicus repair      ROBOT-ASSISTED LAPAROSCOPIC REPAIR OF INGUINAL HERNIA USING DA YARA XI Left 1/17/2025    Procedure: XI ROBOTIC REPAIR, HERNIA, INGUINAL;  Surgeon: Hugo Alcala MD;  Location: [facility] OR;  Service: General;  Laterality: Left;    ROTATOR CUFF REPAIR      left       No family history on file.    Social History     Socioeconomic History    Marital status:    Tobacco Use    Smoking status: Never    Smokeless tobacco: Never   Substance and Sexual Activity    Alcohol use: No    Drug use: No    Sexual activity: Yes     Partners: Female     Social Drivers of Health     Financial Resource Strain: Low Risk  (5/9/2025)    Overall Financial Resource Strain (CARDIA)     Difficulty of Paying Living Expenses: Not hard at all   Food Insecurity: No Food Insecurity (5/9/2025)    Hunger Vital Sign     Worried About Running Out of Food in the Last Year: Never true     Ran Out of Food in the Last Year: Never true   Transportation Needs: No Transportation Needs (5/9/2025)    PRAPARE - Transportation     Lack of Transportation (Medical): No     Lack of Transportation (Non-Medical): No   Physical Activity: Sufficiently Active (5/9/2025)    Exercise Vital Sign     Days of Exercise per Week: 4 days     Minutes of Exercise per Session: 50 min   Stress: Stress  "Concern Present (5/9/2025)    [place] [place] [employer] - Occupational Stress Questionnaire     Feeling of Stress : To some extent   Housing Stability: Low Risk  (5/9/2025)    Housing Stability Vital Sign     Unable to Pay for Housing in the Last Year: No     Homeless in the Last Year: No       MEDICATIONS & ALLERGIES:     Current Outpatient Medications on File Prior to Visit   Medication Sig    methylPREDNISolone (MEDROL DOSEPACK) 4 mg tablet use as directed     No current facility-administered medications on file prior to visit.       Review of patient's allergies indicates:  No Known Allergies    OBJECTIVE:   Vital Signs:  Vitals:    05/13/25 1452   BP: (!) 140/80   Pulse: 62   Weight: 89.4 kg (197 lb 1.5 oz)   Height: 5' 11" (1.803 m)       No results found for this or any previous visit (from the past 24 hours).      Physical Exam:   General:  Well developed, well nourished, no acute distress  HEENT:  Normocephalic, atraumatic  CVS:  RRR, S1 and S2 normal, no murmurs, rubs, gallops  Resp:  Lungs clear to auscultation, no wheezes, rales, rhonchi  MSK:  No muscle atrophy, peripheral edema, full range of motion  Skin:  No rashes, ulcers, erythema  Psych:  Alert and oriented to person, place, and time    ASSESSMENT:     Positive quant gold  --CXR and chest CT normal  --Lymph node biopsy negative for infection  --Karius negative   --Will plan for 4 month course of rifampin to treat latent TB   --Needs monthly CBC and CMP for drug toxicity monitoring  --Expect quant gold to remain positive for life  --For future TB surveillance recommend CXR   --Follow up with me in 4 months      Lymphadenopathy   --Exposure risks including arthropods and livestock  --Brucella, Bartonella, WNV Abs, Lyme, and Q fever negative   --RMSF was positive; completed course of doxycycline   --CT chest/abdomen/pelvis ruled out mass or diffuse LAD      Hernia  S/p repair with general surgery       PLAN:     Mickey was seen today " for fuo (fever of unknown origin).    Diagnoses and all orders for this visit:    Positive QuantiFERON-TB Gold test  -     CBC Auto Differential; Standing  -     Comprehensive Metabolic Panel; Standing    LAD (lymphadenopathy)    H/O inguinal hernia repair    Other orders  -     rifAMpin (RIFADIN) 300 MG capsule; Take 2 capsules (600 mg total) by mouth once daily.          The total time for evaluation and management services performed on 5/13/25 was greater than 35 minutes.        Byron Fischer, DO   Infectious Diseases

## 2025-05-19 ENCOUNTER — PATIENT MESSAGE (OUTPATIENT)
Dept: FAMILY MEDICINE | Facility: CLINIC | Age: 45
End: 2025-05-19
Payer: OTHER GOVERNMENT

## 2025-05-19 ENCOUNTER — TELEPHONE (OUTPATIENT)
Dept: FAMILY MEDICINE | Facility: CLINIC | Age: 45
End: 2025-05-19
Payer: OTHER GOVERNMENT

## 2025-05-19 NOTE — TELEPHONE ENCOUNTER
----- Message from Kartik sent at 5/19/2025 12:00 PM CDT -----  Contact: self  Type:  Needs Medical AdviceWho Called: Mickey Simms the patient rather a call back or a response via [employer]? Call Jennifer Call Back Number: [phone] Information: the patient was wanting a call to discuss the appointment he has last week. He has a few questions. He was wondering it he was tested for shingles?

## 2025-05-19 NOTE — TELEPHONE ENCOUNTER
He will need an appointment.  I will Also make a referral to Dermatology.  His pathology result is in process

## 2025-05-22 ENCOUNTER — RESULTS FOLLOW-UP (OUTPATIENT)
Dept: FAMILY MEDICINE | Facility: CLINIC | Age: 45
End: 2025-05-22

## 2025-06-02 ENCOUNTER — LAB VISIT (OUTPATIENT)
Dept: LAB | Facility: HOSPITAL | Age: 45
End: 2025-06-02
Attending: STUDENT IN AN ORGANIZED HEALTH CARE EDUCATION/TRAINING PROGRAM
Payer: OTHER GOVERNMENT

## 2025-06-02 DIAGNOSIS — R76.12 POSITIVE QUANTIFERON-TB GOLD TEST: ICD-10-CM

## 2025-06-02 LAB
ABSOLUTE EOSINOPHIL (OHS): 0.05 K/UL
ABSOLUTE MONOCYTE (OHS): 0.45 K/UL (ref 0.3–1)
ABSOLUTE NEUTROPHIL COUNT (OHS): 3.85 K/UL (ref 1.8–7.7)
ALBUMIN SERPL BCP-MCNC: 4.2 G/DL (ref 3.5–5.2)
ALP SERPL-CCNC: 49 UNIT/L (ref 40–150)
ALT SERPL W/O P-5'-P-CCNC: 20 UNIT/L (ref 10–44)
ANION GAP (OHS): 6 MMOL/L (ref 8–16)
AST SERPL-CCNC: 22 UNIT/L (ref 11–45)
BASOPHILS # BLD AUTO: 0.03 K/UL
BASOPHILS NFR BLD AUTO: 0.4 %
BILIRUB SERPL-MCNC: 0.8 MG/DL (ref 0.1–1)
BUN SERPL-MCNC: 10 MG/DL (ref 6–20)
CALCIUM SERPL-MCNC: 8.4 MG/DL (ref 8.7–10.5)
CHLORIDE SERPL-SCNC: 105 MMOL/L (ref 95–110)
CO2 SERPL-SCNC: 26 MMOL/L (ref 23–29)
CREAT SERPL-MCNC: 1 MG/DL (ref 0.5–1.4)
ERYTHROCYTE [DISTWIDTH] IN BLOOD BY AUTOMATED COUNT: 12.2 % (ref 11.5–14.5)
GFR SERPLBLD CREATININE-BSD FMLA CKD-EPI: >60 ML/MIN/1.73/M2
GLUCOSE SERPL-MCNC: 128 MG/DL (ref 70–110)
HCT VFR BLD AUTO: 46.3 % (ref 40–54)
HGB BLD-MCNC: 15.6 GM/DL (ref 14–18)
IMM GRANULOCYTES # BLD AUTO: 0.02 K/UL (ref 0–0.04)
IMM GRANULOCYTES NFR BLD AUTO: 0.3 % (ref 0–0.5)
LYMPHOCYTES # BLD AUTO: 2.5 K/UL (ref 1–4.8)
MCH RBC QN AUTO: 28.3 PG (ref 27–31)
MCHC RBC AUTO-ENTMCNC: 33.7 G/DL (ref 32–36)
MCV RBC AUTO: 84 FL (ref 82–98)
NUCLEATED RBC (/100WBC) (OHS): 0 /100 WBC
PLATELET # BLD AUTO: 182 K/UL (ref 150–450)
PMV BLD AUTO: 10.8 FL (ref 9.2–12.9)
POTASSIUM SERPL-SCNC: 4.9 MMOL/L (ref 3.5–5.1)
PROT SERPL-MCNC: 7 GM/DL (ref 6–8.4)
RBC # BLD AUTO: 5.52 M/UL (ref 4.6–6.2)
RELATIVE EOSINOPHIL (OHS): 0.7 %
RELATIVE LYMPHOCYTE (OHS): 36.2 % (ref 18–48)
RELATIVE MONOCYTE (OHS): 6.5 % (ref 4–15)
RELATIVE NEUTROPHIL (OHS): 55.9 % (ref 38–73)
SODIUM SERPL-SCNC: 137 MMOL/L (ref 136–145)
WBC # BLD AUTO: 6.9 K/UL (ref 3.9–12.7)

## 2025-06-02 PROCEDURE — 36415 COLL VENOUS BLD VENIPUNCTURE: CPT | Mod: PO

## 2025-06-02 PROCEDURE — 80053 COMPREHEN METABOLIC PANEL: CPT

## 2025-06-02 PROCEDURE — 85025 COMPLETE CBC W/AUTO DIFF WBC: CPT

## 2025-06-03 ENCOUNTER — OFFICE VISIT (OUTPATIENT)
Dept: FAMILY MEDICINE | Facility: CLINIC | Age: 45
End: 2025-06-03
Payer: OTHER GOVERNMENT

## 2025-06-03 VITALS
TEMPERATURE: 98 F | OXYGEN SATURATION: 99 % | DIASTOLIC BLOOD PRESSURE: 82 MMHG | BODY MASS INDEX: 27.2 KG/M2 | WEIGHT: 194.31 LBS | HEART RATE: 76 BPM | SYSTOLIC BLOOD PRESSURE: 124 MMHG | HEIGHT: 71 IN

## 2025-06-03 DIAGNOSIS — L98.9 SKIN LESION: ICD-10-CM

## 2025-06-03 DIAGNOSIS — Z22.7 TB LUNG, LATENT: ICD-10-CM

## 2025-06-03 DIAGNOSIS — M25.50 ARTHRALGIA, UNSPECIFIED JOINT: ICD-10-CM

## 2025-06-03 DIAGNOSIS — L30.8 SPONGIOTIC DERMATITIS: Primary | ICD-10-CM

## 2025-06-03 PROCEDURE — 99214 OFFICE O/P EST MOD 30 MIN: CPT | Mod: PBBFAC,PN | Performed by: STUDENT IN AN ORGANIZED HEALTH CARE EDUCATION/TRAINING PROGRAM

## 2025-06-03 PROCEDURE — 99214 OFFICE O/P EST MOD 30 MIN: CPT | Mod: S$PBB,,, | Performed by: STUDENT IN AN ORGANIZED HEALTH CARE EDUCATION/TRAINING PROGRAM

## 2025-06-03 PROCEDURE — 99999 PR PBB SHADOW E&M-EST. PATIENT-LVL IV: CPT | Mod: PBBFAC,,, | Performed by: STUDENT IN AN ORGANIZED HEALTH CARE EDUCATION/TRAINING PROGRAM

## 2025-06-03 PROCEDURE — G2211 COMPLEX E/M VISIT ADD ON: HCPCS | Mod: S$PBB,,, | Performed by: STUDENT IN AN ORGANIZED HEALTH CARE EDUCATION/TRAINING PROGRAM

## 2025-06-03 RX ORDER — TRIAMCINOLONE ACETONIDE 1 MG/G
CREAM TOPICAL 2 TIMES DAILY
Qty: 80 G | Refills: 1 | Status: SHIPPED | OUTPATIENT
Start: 2025-06-03

## 2025-06-03 RX ORDER — HYDROXYZINE HYDROCHLORIDE 25 MG/1
25 TABLET, FILM COATED ORAL 3 TIMES DAILY PRN
Qty: 90 TABLET | Refills: 0 | Status: SHIPPED | OUTPATIENT
Start: 2025-06-03

## 2025-06-04 ENCOUNTER — OFFICE VISIT (OUTPATIENT)
Dept: DERMATOLOGY | Facility: CLINIC | Age: 45
End: 2025-06-04
Payer: OTHER GOVERNMENT

## 2025-06-04 DIAGNOSIS — L98.9 SKIN LESION: ICD-10-CM

## 2025-06-04 DIAGNOSIS — L30.8 SPONGIOTIC DERMATITIS: ICD-10-CM

## 2025-06-04 DIAGNOSIS — L20.9 ATOPIC DERMATITIS, UNSPECIFIED TYPE: Primary | ICD-10-CM

## 2025-06-04 PROCEDURE — G2211 COMPLEX E/M VISIT ADD ON: HCPCS | Mod: S$PBB,,, | Performed by: STUDENT IN AN ORGANIZED HEALTH CARE EDUCATION/TRAINING PROGRAM

## 2025-06-04 PROCEDURE — 99204 OFFICE O/P NEW MOD 45 MIN: CPT | Mod: S$PBB,,, | Performed by: STUDENT IN AN ORGANIZED HEALTH CARE EDUCATION/TRAINING PROGRAM

## 2025-06-04 PROCEDURE — 99213 OFFICE O/P EST LOW 20 MIN: CPT | Mod: PBBFAC | Performed by: STUDENT IN AN ORGANIZED HEALTH CARE EDUCATION/TRAINING PROGRAM

## 2025-06-04 PROCEDURE — 99999 PR PBB SHADOW E&M-EST. PATIENT-LVL III: CPT | Mod: PBBFAC,,, | Performed by: STUDENT IN AN ORGANIZED HEALTH CARE EDUCATION/TRAINING PROGRAM

## 2025-06-04 RX ORDER — DUPILUMAB 300 MG/2ML
INJECTION, SOLUTION SUBCUTANEOUS
Qty: 4 ML | Refills: 0 | Status: ACTIVE | OUTPATIENT
Start: 2025-06-04

## 2025-06-04 RX ORDER — MOMETASONE FUROATE 1 MG/G
OINTMENT TOPICAL DAILY
Qty: 45 G | Refills: 2 | Status: SHIPPED | OUTPATIENT
Start: 2025-06-04

## 2025-06-04 RX ORDER — DUPILUMAB 300 MG/2ML
300 INJECTION, SOLUTION SUBCUTANEOUS
Qty: 4 ML | Refills: 4 | Status: ACTIVE | OUTPATIENT
Start: 2025-06-04

## 2025-06-05 PROBLEM — L20.9 ATOPIC DERMATITIS: Status: ACTIVE | Noted: 2025-06-05

## 2025-06-06 RX ORDER — TACROLIMUS 1 MG/G
OINTMENT TOPICAL 2 TIMES DAILY
Qty: 60 G | Refills: 1 | Status: SHIPPED | OUTPATIENT
Start: 2025-06-06

## 2025-06-10 ENCOUNTER — TELEPHONE (OUTPATIENT)
Dept: DERMATOLOGY | Facility: CLINIC | Age: 45
End: 2025-06-10
Payer: OTHER GOVERNMENT

## 2025-06-10 NOTE — TELEPHONE ENCOUNTER
----- Message from Phillip Shah MD sent at 6/6/2025  6:13 AM CDT -----  Regarding: FW: Dupixent  Can we please inform the patient that I spoke to the pharmacist and his insurance won't cover the medication until he fails one other topical medication, which I sent to his pharmacy already. We'll re-check back in about 6 weeks, and if still having symptoms, they'll likely cover the medication then. Please schedule follow-up in about 6-8 weeks (virtual is fine). Thanks.  ----- Message -----  From: Drew Bell, PharmD  Sent: 6/5/2025   8:27 AM CDT  To: Phillip Shah MD; Pablo Fisher Staff  Subject: Dupixent                                         Good morning Dr Shah,Unfortunately, the patient's plan will not cover Dupixent until he has tried and failed both topical corticosteroids AND one topical calcineurin inhibitor such as Protopic or Elidel. Would it be appropriate for him to trial Protopic or Elidel first to assist with PA approval?Thank you,Drew Bell, PharmDClinical Pharmacist [facility] P: [phone]

## 2025-06-10 NOTE — TELEPHONE ENCOUNTER
Outreach made to patient regarding Dupixent denial. Spoke to patient advised that the medication was denied until he fails one other topical medication, which Dr. Shah has already sent to the pharmacy. I advised that she would like to re-check back in about 6 -8 weeks, and if still having symptoms. Appt scheduled for 8/5/2025.      ----- Message from Phillip Shah MD sent at 6/6/2025  6:13 AM CDT -----  Regarding: FW: Dupixent  Can we please inform the patient that I spoke to the pharmacist and his insurance won't cover the medication until he fails one other topical medication, which I sent to his pharmacy already. We'll re-check back in about 6 weeks, and if still having symptoms, they'll likely cover the medication then. Please schedule follow-up in about 6-8 weeks (virtual is fine). Thanks.  ----- Message -----  From: Drew Bell, PharmD  Sent: 6/5/2025   8:27 AM CDT  To: Phillip Shah MD; [facility] Staff  Subject: Dupixent                                         Good morning Dr Shah,Unfortunately, the patient's plan will not cover Dupixent until he has tried and failed both topical corticosteroids AND one topical calcineurin inhibitor such as Protopic or Elidel. Would it be appropriate for him to trial Protopic or Elidel first to assist with PA approval?Thank you,Drew Bell, PharmDClinical Pharmacist [facility] P: [phone]

## 2025-06-26 ENCOUNTER — PATIENT MESSAGE (OUTPATIENT)
Dept: DERMATOLOGY | Facility: CLINIC | Age: 45
End: 2025-06-26
Payer: OTHER GOVERNMENT

## 2025-06-27 NOTE — TELEPHONE ENCOUNTER
We can get him in sooner like some time in July. We'll need to document that after 6 weeks or so the medication didn't work so that we can get the Dupixent covered.

## 2025-07-07 ENCOUNTER — LAB VISIT (OUTPATIENT)
Dept: LAB | Facility: HOSPITAL | Age: 45
End: 2025-07-07
Attending: STUDENT IN AN ORGANIZED HEALTH CARE EDUCATION/TRAINING PROGRAM
Payer: OTHER GOVERNMENT

## 2025-07-07 ENCOUNTER — OFFICE VISIT (OUTPATIENT)
Dept: FAMILY MEDICINE | Facility: CLINIC | Age: 45
End: 2025-07-07
Payer: OTHER GOVERNMENT

## 2025-07-07 VITALS
WEIGHT: 194.88 LBS | HEIGHT: 71 IN | SYSTOLIC BLOOD PRESSURE: 126 MMHG | OXYGEN SATURATION: 99 % | BODY MASS INDEX: 27.28 KG/M2 | HEART RATE: 70 BPM | DIASTOLIC BLOOD PRESSURE: 76 MMHG

## 2025-07-07 DIAGNOSIS — M25.561 ACUTE PAIN OF BOTH KNEES: ICD-10-CM

## 2025-07-07 DIAGNOSIS — R76.12 POSITIVE QUANTIFERON-TB GOLD TEST: ICD-10-CM

## 2025-07-07 DIAGNOSIS — M25.562 ACUTE PAIN OF BOTH KNEES: ICD-10-CM

## 2025-07-07 DIAGNOSIS — M79.671 PAIN IN BOTH FEET: Primary | ICD-10-CM

## 2025-07-07 DIAGNOSIS — M79.672 PAIN IN BOTH FEET: Primary | ICD-10-CM

## 2025-07-07 LAB
ABSOLUTE EOSINOPHIL (OHS): 0.06 K/UL
ABSOLUTE MONOCYTE (OHS): 0.6 K/UL (ref 0.3–1)
ABSOLUTE NEUTROPHIL COUNT (OHS): 5.99 K/UL (ref 1.8–7.7)
ALBUMIN SERPL BCP-MCNC: 4.2 G/DL (ref 3.5–5.2)
ALP SERPL-CCNC: 60 UNIT/L (ref 40–150)
ALT SERPL W/O P-5'-P-CCNC: 14 UNIT/L (ref 10–44)
ANION GAP (OHS): 7 MMOL/L (ref 8–16)
AST SERPL-CCNC: 17 UNIT/L (ref 11–45)
BASOPHILS # BLD AUTO: 0.04 K/UL
BASOPHILS NFR BLD AUTO: 0.4 %
BILIRUB SERPL-MCNC: 0.5 MG/DL (ref 0.1–1)
BUN SERPL-MCNC: 15 MG/DL (ref 6–20)
CALCIUM SERPL-MCNC: 9.2 MG/DL (ref 8.7–10.5)
CHLORIDE SERPL-SCNC: 107 MMOL/L (ref 95–110)
CO2 SERPL-SCNC: 26 MMOL/L (ref 23–29)
CREAT SERPL-MCNC: 0.8 MG/DL (ref 0.5–1.4)
ERYTHROCYTE [DISTWIDTH] IN BLOOD BY AUTOMATED COUNT: 12.4 % (ref 11.5–14.5)
GFR SERPLBLD CREATININE-BSD FMLA CKD-EPI: >60 ML/MIN/1.73/M2
GLUCOSE SERPL-MCNC: 100 MG/DL (ref 70–110)
HCT VFR BLD AUTO: 45.7 % (ref 40–54)
HGB BLD-MCNC: 15.5 GM/DL (ref 14–18)
IMM GRANULOCYTES # BLD AUTO: 0.05 K/UL (ref 0–0.04)
IMM GRANULOCYTES NFR BLD AUTO: 0.6 % (ref 0–0.5)
LYMPHOCYTES # BLD AUTO: 2.19 K/UL (ref 1–4.8)
MCH RBC QN AUTO: 28.5 PG (ref 27–31)
MCHC RBC AUTO-ENTMCNC: 33.9 G/DL (ref 32–36)
MCV RBC AUTO: 84 FL (ref 82–98)
NUCLEATED RBC (/100WBC) (OHS): 0 /100 WBC
PLATELET # BLD AUTO: 189 K/UL (ref 150–450)
PMV BLD AUTO: 10.5 FL (ref 9.2–12.9)
POTASSIUM SERPL-SCNC: 4.6 MMOL/L (ref 3.5–5.1)
PROT SERPL-MCNC: 7.4 GM/DL (ref 6–8.4)
RBC # BLD AUTO: 5.44 M/UL (ref 4.6–6.2)
RELATIVE EOSINOPHIL (OHS): 0.7 %
RELATIVE LYMPHOCYTE (OHS): 24.5 % (ref 18–48)
RELATIVE MONOCYTE (OHS): 6.7 % (ref 4–15)
RELATIVE NEUTROPHIL (OHS): 67.1 % (ref 38–73)
SODIUM SERPL-SCNC: 140 MMOL/L (ref 136–145)
WBC # BLD AUTO: 8.93 K/UL (ref 3.9–12.7)

## 2025-07-07 PROCEDURE — 99214 OFFICE O/P EST MOD 30 MIN: CPT | Mod: S$PBB,,, | Performed by: STUDENT IN AN ORGANIZED HEALTH CARE EDUCATION/TRAINING PROGRAM

## 2025-07-07 PROCEDURE — 99214 OFFICE O/P EST MOD 30 MIN: CPT | Mod: PBBFAC,PN | Performed by: STUDENT IN AN ORGANIZED HEALTH CARE EDUCATION/TRAINING PROGRAM

## 2025-07-07 PROCEDURE — G2211 COMPLEX E/M VISIT ADD ON: HCPCS | Mod: ,,, | Performed by: STUDENT IN AN ORGANIZED HEALTH CARE EDUCATION/TRAINING PROGRAM

## 2025-07-07 PROCEDURE — 36415 COLL VENOUS BLD VENIPUNCTURE: CPT | Mod: PO

## 2025-07-07 PROCEDURE — 85025 COMPLETE CBC W/AUTO DIFF WBC: CPT

## 2025-07-07 PROCEDURE — 80053 COMPREHEN METABOLIC PANEL: CPT

## 2025-07-07 PROCEDURE — 99999 PR PBB SHADOW E&M-EST. PATIENT-LVL IV: CPT | Mod: PBBFAC,,, | Performed by: STUDENT IN AN ORGANIZED HEALTH CARE EDUCATION/TRAINING PROGRAM

## 2025-07-07 RX ORDER — METHYLPREDNISOLONE 4 MG/1
TABLET ORAL
Qty: 1 EACH | Refills: 0 | Status: SHIPPED | OUTPATIENT
Start: 2025-07-07

## 2025-08-04 ENCOUNTER — OFFICE VISIT (OUTPATIENT)
Dept: PODIATRY | Facility: CLINIC | Age: 45
End: 2025-08-04
Payer: OTHER GOVERNMENT

## 2025-08-04 ENCOUNTER — LAB VISIT (OUTPATIENT)
Dept: LAB | Facility: HOSPITAL | Age: 45
End: 2025-08-04
Attending: STUDENT IN AN ORGANIZED HEALTH CARE EDUCATION/TRAINING PROGRAM
Payer: OTHER GOVERNMENT

## 2025-08-04 VITALS — BODY MASS INDEX: 27.28 KG/M2 | HEIGHT: 71 IN | WEIGHT: 194.88 LBS

## 2025-08-04 DIAGNOSIS — M79.671 PAIN IN BOTH FEET: Primary | ICD-10-CM

## 2025-08-04 DIAGNOSIS — M77.31 HEEL SPUR, RIGHT: ICD-10-CM

## 2025-08-04 DIAGNOSIS — R23.4 SKIN FISSURE: ICD-10-CM

## 2025-08-04 DIAGNOSIS — M19.071 OSTEOARTHRITIS OF MIDFOOT, RIGHT: ICD-10-CM

## 2025-08-04 DIAGNOSIS — M76.61 TENDONITIS, ACHILLES, RIGHT: ICD-10-CM

## 2025-08-04 DIAGNOSIS — Q66.72 CAVUS DEFORMITY OF LEFT FOOT: ICD-10-CM

## 2025-08-04 DIAGNOSIS — M24.572 CONTRACTURE, LEFT ANKLE: ICD-10-CM

## 2025-08-04 DIAGNOSIS — M35.9 AUTOIMMUNE DISEASE: ICD-10-CM

## 2025-08-04 DIAGNOSIS — M77.51 BURSITIS OF POSTERIOR HEEL, RIGHT: ICD-10-CM

## 2025-08-04 DIAGNOSIS — Q66.71 CAVUS DEFORMITY OF RIGHT FOOT: ICD-10-CM

## 2025-08-04 DIAGNOSIS — M77.52 BURSITIS OF POSTERIOR HEEL, LEFT: ICD-10-CM

## 2025-08-04 DIAGNOSIS — M77.32 HEEL SPUR, LEFT: ICD-10-CM

## 2025-08-04 DIAGNOSIS — M19.072 OSTEOARTHRITIS OF MIDFOOT, LEFT: ICD-10-CM

## 2025-08-04 DIAGNOSIS — R76.12 POSITIVE QUANTIFERON-TB GOLD TEST: ICD-10-CM

## 2025-08-04 DIAGNOSIS — M24.571 CONTRACTURE, RIGHT ANKLE: ICD-10-CM

## 2025-08-04 DIAGNOSIS — M76.62 TENDONITIS, ACHILLES, LEFT: ICD-10-CM

## 2025-08-04 DIAGNOSIS — L85.3 XEROSIS CUTIS: ICD-10-CM

## 2025-08-04 DIAGNOSIS — M79.672 PAIN IN BOTH FEET: Primary | ICD-10-CM

## 2025-08-04 LAB
ABSOLUTE EOSINOPHIL (OHS): 0.09 K/UL
ABSOLUTE MONOCYTE (OHS): 0.44 K/UL (ref 0.3–1)
ABSOLUTE NEUTROPHIL COUNT (OHS): 4.33 K/UL (ref 1.8–7.7)
ALBUMIN SERPL BCP-MCNC: 3.9 G/DL (ref 3.5–5.2)
ALP SERPL-CCNC: 50 UNIT/L (ref 40–150)
ALT SERPL W/O P-5'-P-CCNC: 21 UNIT/L (ref 0–55)
ANION GAP (OHS): 7 MMOL/L (ref 8–16)
AST SERPL-CCNC: 29 UNIT/L (ref 0–50)
BASOPHILS # BLD AUTO: 0.03 K/UL
BASOPHILS NFR BLD AUTO: 0.4 %
BILIRUB SERPL-MCNC: 0.4 MG/DL (ref 0.1–1)
BUN SERPL-MCNC: 17 MG/DL (ref 6–20)
CALCIUM SERPL-MCNC: 8.4 MG/DL (ref 8.7–10.5)
CHLORIDE SERPL-SCNC: 106 MMOL/L (ref 95–110)
CO2 SERPL-SCNC: 25 MMOL/L (ref 23–29)
CREAT SERPL-MCNC: 1 MG/DL (ref 0.5–1.4)
ERYTHROCYTE [DISTWIDTH] IN BLOOD BY AUTOMATED COUNT: 12.5 % (ref 11.5–14.5)
GFR SERPLBLD CREATININE-BSD FMLA CKD-EPI: >60 ML/MIN/1.73/M2
GLUCOSE SERPL-MCNC: 131 MG/DL (ref 70–110)
HCT VFR BLD AUTO: 43.8 % (ref 40–54)
HGB BLD-MCNC: 14.6 GM/DL (ref 14–18)
IMM GRANULOCYTES # BLD AUTO: 0.07 K/UL (ref 0–0.04)
IMM GRANULOCYTES NFR BLD AUTO: 0.9 % (ref 0–0.5)
LYMPHOCYTES # BLD AUTO: 2.5 K/UL (ref 1–4.8)
MCH RBC QN AUTO: 28.4 PG (ref 27–31)
MCHC RBC AUTO-ENTMCNC: 33.3 G/DL (ref 32–36)
MCV RBC AUTO: 85 FL (ref 82–98)
NUCLEATED RBC (/100WBC) (OHS): 0 /100 WBC
PLATELET # BLD AUTO: 126 K/UL (ref 150–450)
PMV BLD AUTO: 11.7 FL (ref 9.2–12.9)
POTASSIUM SERPL-SCNC: 4.8 MMOL/L (ref 3.5–5.1)
PROT SERPL-MCNC: 6.6 GM/DL (ref 6–8.4)
RBC # BLD AUTO: 5.14 M/UL (ref 4.6–6.2)
RELATIVE EOSINOPHIL (OHS): 1.2 %
RELATIVE LYMPHOCYTE (OHS): 33.5 % (ref 18–48)
RELATIVE MONOCYTE (OHS): 5.9 % (ref 4–15)
RELATIVE NEUTROPHIL (OHS): 58.1 % (ref 38–73)
SODIUM SERPL-SCNC: 138 MMOL/L (ref 136–145)
WBC # BLD AUTO: 7.46 K/UL (ref 3.9–12.7)

## 2025-08-04 PROCEDURE — 80053 COMPREHEN METABOLIC PANEL: CPT

## 2025-08-04 PROCEDURE — 99999 PR PBB SHADOW E&M-EST. PATIENT-LVL III: CPT | Mod: PBBFAC,,, | Performed by: PODIATRIST

## 2025-08-04 PROCEDURE — 99213 OFFICE O/P EST LOW 20 MIN: CPT | Mod: PBBFAC | Performed by: PODIATRIST

## 2025-08-04 PROCEDURE — 99204 OFFICE O/P NEW MOD 45 MIN: CPT | Mod: S$PBB,,, | Performed by: PODIATRIST

## 2025-08-04 PROCEDURE — 36415 COLL VENOUS BLD VENIPUNCTURE: CPT | Mod: PO

## 2025-08-04 PROCEDURE — 85025 COMPLETE CBC W/AUTO DIFF WBC: CPT

## 2025-08-04 NOTE — PROGRESS NOTES
Subjective:       Patient ID: Mickey Dominguez is a 44 y.o. male.    Chief Complaint: Foot Pain (Pain in both feet, rate pain 2/10, nondiabetic, pt is wearing tennis shoes )    HPI: Mickey Dominguez presents to the office today with complains of occasional dermatologic eruptions of the LE. Has a Hx of eczema. Follows with Dermatology. Is on Dupixent. Also follows with Rheumatology and a . Is in the [employer] (has traveled the world and been exposed to several environmental and chemical hazards). Has been worked up for viral etiology of multiple UE and LE and diffuse dermatologic eruptions/flares. Jobs/Hikes/Runs often, but states diffuse myalgia and arthralgia s/p.     Review of patient's allergies indicates:  No Known Allergies    Past Medical History:   Diagnosis Date    Allergy     Anxiety     Arthritis     Closed skull fracture with intracranial hemorrhage, with loss of consciousness     over 24 hour period    Neuromuscular disorder     Obstructive sleep apnea on CPAP     PTSD (post-traumatic stress disorder)        No family history on file.    Social History[1]    Past Surgical History:   Procedure Laterality Date    BIOPSY OF INGUINAL LYMPH NODE Left 1/17/2025    Procedure: BIOPSY, LYMPH NODE, INGUINAL;  Surgeon: Hugo Alcala MD;  Location: [facility] OR;  Service: General;  Laterality: Left;    FRACTURE SURGERY      left femur    menicus repair      ROBOT-ASSISTED LAPAROSCOPIC REPAIR OF INGUINAL HERNIA USING DA YARA XI Left 1/17/2025    Procedure: XI ROBOTIC REPAIR, HERNIA, INGUINAL;  Surgeon: Hugo Alcala MD;  Location: [facility] OR;  Service: General;  Laterality: Left;    ROTATOR CUFF REPAIR      left       Review of Systems   Constitutional:  Negative for chills, fatigue and fever.   HENT:  Negative for hearing loss.    Eyes:  Negative for photophobia and visual disturbance.   Respiratory:  Negative for cough, chest tightness, shortness of breath and wheezing.    Cardiovascular:  Negative  "for chest pain and palpitations.   Gastrointestinal:  Negative for constipation, diarrhea, nausea and vomiting.   Endocrine: Negative for cold intolerance and heat intolerance.   Genitourinary:  Negative for flank pain.   Musculoskeletal:  Negative for neck pain and neck stiffness.   Neurological:  Negative for light-headedness and headaches.   Psychiatric/Behavioral:  Negative for sleep disturbance.           Objective:   Ht 5' 11" (1.803 m)   Wt 88.4 kg (194 lb 14.2 oz)   BMI 27.18 kg/m²         Physical Exam  LOWER EXTREMITY PHYSICAL EXAMINATION  DERMATOLOGY: Skin is supple, dry and intact.     VASCULAR: The B/L dorsalis pedis pulse is 2/4 and the posterior tibial pulse is 2/4. Hair growth is noted on the dorsal foot and digits. Proximal to distal, warm to warm. Capillary refill time is WNL at less than 3s.    ORTHOPEDIC: Manual Muscle Testing is 5/5 in all planes on the B/L LE, without pains, with and without resistance. Cavus foot type is noted. Mild midfoot (1st and 2nd TMTJ) spurring/DJD is noted w/o pain. B/L posterior heel spurring is noted with bursitis. Distal Achilles tendonitis is noted. Minimal gastroc equinus is noted.    NEUROLOGY: Proprioception is intact, bilateral. Sensation to light touch is intact. Negative Tinel's Sign and negative Valleix sign. No neurological sensations with compression of the area of Villa's Nerve in the area of the Abductor Hallucis muscle belly. Upon palpation of the interspaces, there are no neurological sensations stated that radiate proximal or distal. Upon compression of the metatarsal heads from medial to lateral, no neurological sensations or symptoms are stated.         Assessment:     1. Pain in both feet    2. Heel spur, left    3. Heel spur, right    4. Bursitis of posterior heel, left    5. Bursitis of posterior heel, right    6. Tendonitis, Achilles, left    7. Tendonitis, Achilles, right    8. Contracture, right ankle    9. Contracture, left ankle    10. " Cavus deformity of right foot    11. Cavus deformity of left foot    12. Osteoarthritis of midfoot, left    13. Osteoarthritis of midfoot, right    14. Autoimmune disease    15. Skin fissure    16. Xerosis cutis        Plan:     Pain in both feet  -     Ambulatory referral/consult to Podiatry    Heel spur, left    Heel spur, right    Bursitis of posterior heel, left    Bursitis of posterior heel, right    Tendonitis, Achilles, left    Tendonitis, Achilles, right    Contracture, right ankle    Contracture, left ankle    Cavus deformity of right foot    Cavus deformity of left foot    Osteoarthritis of midfoot, left    Osteoarthritis of midfoot, right    Autoimmune disease    Skin fissure    Xerosis cutis      Thorough discussion is had with the patient today, concerning the diagnosis, its etiology, and the treatment algorithm at present.     Foot ailments/pathology/deformity as outlined above.    Dermatologic pathology that flares prn, is seemingly autoimmune or viral in etiology.    Recommend twice to 3 times daily topical emollient. Did discuss with the patient concerning dry and thin skin in relation to complications due to comorbid states. Patient will purchase OTC Urea 40% with 2% Salicylic Acid.     Rheumatology and Virology follow up.    NSAIDs prn foot ailments/pains.     Patient should ambulate with proper and supportive shoe gear.  These are shoes with firm and robust arch support; medial counter.  Shoes which only bend at the metatarsophalangeal joint and which are rigid in the midfoot and hindfoot. Running type or cross training type shoes gear which are designed for pronation control is best.             Future Appointments   Date Time Provider Department Center   8/4/2025 10:10 AM LABORATORY, [facility] LAB [place]   8/5/2025 11:45 AM Phillip Shah MD [facility]   9/8/2025 10:00 AM LABORATORY, [facility] [place]   9/15/2025 11:00 AM Victorino Fischer DO [facility]    11/4/2025  9:30 AM Phillip Shah MD [facility]            [1]   Social History  Socioeconomic History    Marital status:    Tobacco Use    Smoking status: Never    Smokeless tobacco: Never   Substance and Sexual Activity    Alcohol use: No    Drug use: No    Sexual activity: Yes     Partners: Female     Social Drivers of Health     Financial Resource Strain: Low Risk  (5/9/2025)    Overall Financial Resource Strain (CARDIA)     Difficulty of Paying Living Expenses: Not hard at all   Food Insecurity: No Food Insecurity (5/9/2025)    Hunger Vital Sign     Worried About Running Out of Food in the Last Year: Never true     Ran Out of Food in the Last Year: Never true   Transportation Needs: No Transportation Needs (5/9/2025)    PRAPARE - Transportation     Lack of Transportation (Medical): No     Lack of Transportation (Non-Medical): No   Physical Activity: Sufficiently Active (5/9/2025)    Exercise Vital Sign     Days of Exercise per Week: 4 days     Minutes of Exercise per Session: 50 min   Stress: Stress Concern Present (5/9/2025)    [employer] - Occupational Stress Questionnaire     Feeling of Stress : To some extent   Housing Stability: Low Risk  (5/9/2025)    Housing Stability Vital Sign     Unable to Pay for Housing in the Last Year: No     Homeless in the Last Year: No

## 2025-08-05 ENCOUNTER — OFFICE VISIT (OUTPATIENT)
Dept: DERMATOLOGY | Facility: CLINIC | Age: 45
End: 2025-08-05
Payer: OTHER GOVERNMENT

## 2025-08-05 DIAGNOSIS — L20.9 ATOPIC DERMATITIS, UNSPECIFIED TYPE: ICD-10-CM

## 2025-08-05 NOTE — PROGRESS NOTES
The patient location is: [place]  The chief complaint leading to consultation is: follow-up eczema    Visit type: audiovisual    Face to Face time with patient: 10mins  10 minutes of total time spent on the encounter, which includes face to face time and non-face to face time preparing to see the patient (eg, review of tests), Obtaining and/or reviewing separately obtained history, Documenting clinical information in the electronic or other health record, Independently interpreting results (not separately reported) and communicating results to the patient/family/caregiver, or Care coordination (not separately reported).         Each patient to whom he or she provides medical services by telemedicine is:  (1) informed of the relationship between the physician and patient and the respective role of any other health care provider with respect to management of the patient; and (2) notified that he or she may decline to receive medical services by telemedicine and may withdraw from such care at any time.    History of Present Illness: The patient presents for follow up of eczema, last seen on 6/4/25. Patient recently was approved for and started on Dupixent (completed the loading dose and ready to start the maintenance dose). Reports seeing many areas starting to improve. Has noticed less itching and irritation. Has a few areas on face still present, but overall, getting better quickly with the Dupixent. Tolerating well with no reported side effects.       ROS: Otherwise negative    PE: const/neuro - AAOx3, NAD          Skin -       A/P: 1) Atopic dermatitis - patient recently just completed loading dose of Dupixent and has already started to see and feel improvements in symptoms. Recommend to continue tacrolimus ointment on the face for now and monitor closely once on the maintenance dose of the Dupixent.

## 2025-08-06 RX ORDER — DUPILUMAB 300 MG/2ML
300 INJECTION, SOLUTION SUBCUTANEOUS
Qty: 4 ML | Refills: 8 | Status: ACTIVE | OUTPATIENT
Start: 2025-08-06

## (undated) DEVICE — MANIFOLD 4 PORT

## (undated) DEVICE — SOL NACL IRR 1000ML BTL

## (undated) DEVICE — SYR 10CC LUER LOCK

## (undated) DEVICE — DRAPE COLUMN DAVINCI XI

## (undated) DEVICE — DRAPE LAPSCP CHOLE 122X102X78

## (undated) DEVICE — APPLICATOR CHLORAPREP ORN 26ML

## (undated) DEVICE — ELECTRODE REM PLYHSV RETURN 9

## (undated) DEVICE — TIP GRASPER FENESTRATED DISP

## (undated) DEVICE — NDL PNEUMO INSUFFLATI 120MM

## (undated) DEVICE — APPLICATOR STRL COT 2INNR 6IN

## (undated) DEVICE — COVER TIP CURVED SCISSORS XI

## (undated) DEVICE — TRAY CATH 1-LYR URIMTR 16FR

## (undated) DEVICE — ADHESIVE MASTISOL VIAL 48/BX

## (undated) DEVICE — KIT ANTIFOG W/SPONG & FLUID

## (undated) DEVICE — SPONGE COTTON TRAY 4X4IN

## (undated) DEVICE — TOWEL OR DISP STRL BLUE 4/PK

## (undated) DEVICE — COVER LIGHT HANDLE 80/CA

## (undated) DEVICE — DRESSING TRANS 4X4 TEGADERM

## (undated) DEVICE — CONTAINER SPECIMEN OR STER 4OZ

## (undated) DEVICE — NDL ECLIPSE SAF REG 25GX1.5IN

## (undated) DEVICE — HEADREST ROUND DISP FOAM 9IN

## (undated) DEVICE — SUT STRATAFIX SPIRAL 20CM

## (undated) DEVICE — GLOVE SENSICARE PI GRN 8

## (undated) DEVICE — GOWN POLY REINF BRTH SLV XL

## (undated) DEVICE — SOL NORMAL USPCA 0.9%

## (undated) DEVICE — TAPE SILK 3IN

## (undated) DEVICE — SUT VICRYL 3-0 27 SH

## (undated) DEVICE — CLIPPER BLADE MOD 4406 (CAREF)

## (undated) DEVICE — SUT MCRYL PLUS 4-0 PS2 27IN

## (undated) DEVICE — PAD PINK TRENDELENBURG POS XL

## (undated) DEVICE — DRAPE ARM DAVINCI XI

## (undated) DEVICE — DRAPE T TRNSVRS LAP 102X78X121

## (undated) DEVICE — DECANTER 6 VIAL

## (undated) DEVICE — DRAPE THREE-QTR REINF 53X77IN

## (undated) DEVICE — PACK BASIC SETUP SC BR

## (undated) DEVICE — SET PNEUMOCLEAR HEAT HUM SE HF

## (undated) DEVICE — SEAL CANN UNIVERSAL 5-12MM

## (undated) DEVICE — OBTURATOR BLADELESS 8MM XI CLR

## (undated) DEVICE — NDL ECLIPSE SAFETY 23G 1.5IN